# Patient Record
Sex: MALE | Race: BLACK OR AFRICAN AMERICAN | Employment: FULL TIME | URBAN - METROPOLITAN AREA
[De-identification: names, ages, dates, MRNs, and addresses within clinical notes are randomized per-mention and may not be internally consistent; named-entity substitution may affect disease eponyms.]

---

## 2017-11-17 ENCOUNTER — HOSPITAL ENCOUNTER (EMERGENCY)
Age: 37
Discharge: HOME | End: 2017-11-17
Attending: EMERGENCY MEDICINE
Payer: SELF-PAY

## 2017-11-17 VITALS
SYSTOLIC BLOOD PRESSURE: 140 MMHG | HEART RATE: 90 BPM | OXYGEN SATURATION: 98 % | BODY MASS INDEX: 30.31 KG/M2 | RESPIRATION RATE: 18 BRPM | HEIGHT: 68 IN | WEIGHT: 200 LBS | DIASTOLIC BLOOD PRESSURE: 85 MMHG | TEMPERATURE: 98.6 F

## 2017-11-17 DIAGNOSIS — B37.0 THRUSH: Primary | ICD-10-CM

## 2017-11-17 PROCEDURE — 99283 EMERGENCY DEPT VISIT LOW MDM: CPT | Performed by: PHYSICIAN ASSISTANT

## 2017-11-17 RX ORDER — FLUCONAZOLE 150 MG/1
150 TABLET ORAL
Qty: 2 TAB | Refills: 0 | Status: SHIPPED | OUTPATIENT
Start: 2017-11-17 | End: 2017-11-17

## 2017-11-17 RX ORDER — OMEPRAZOLE 10 MG/1
10 CAPSULE, DELAYED RELEASE ORAL DAILY
COMMUNITY
End: 2018-10-01

## 2017-11-17 RX ORDER — NYSTATIN 100000 [USP'U]/ML
1 SUSPENSION ORAL 4 TIMES DAILY
Qty: 473 ML | Refills: 0 | Status: SHIPPED | OUTPATIENT
Start: 2017-11-17 | End: 2018-02-13

## 2017-11-17 NOTE — DISCHARGE INSTRUCTIONS
Candidiasis: Care Instructions  Your Care Instructions  Candidiasis (say \"fyl-dke-VP-uh-rosalba\") is a yeast infection. Yeast normally lives in your body. But it can cause problems if your body's defenses don't work as they should. Some medicines can increase your chance of getting a yeast infection. These include antibiotics, steroids, and cancer drugs. And some diseases like AIDS and diabetes can make you more likely to get yeast infections. There are different types of yeast infections. Satish Payne is a yeast infection in the mouth. It usually occurs in people with weak immune systems. It causes white patches inside the mouth and throat. Yeast infections of the skin usually occur in skin folds where the skin stays moist. They cause red, oozing patches on your skin. Babies can get these infections under the diaper. People who often wear gloves can get them on their hands. Many women get vaginal yeast infections. They are most common when women take antibiotics. These infections can cause the vagina to itch and burn. They also cause white discharge that looks like cottage cheese. In rare cases, yeast infects the blood. This can cause serious disease. This kind of infection is treated with medicine given through a needle into a vein (IV). After you start treatment, a yeast infection usually goes away quickly. But if your immune system is weak, the infection may come back. Tell your doctor if you get yeast infections often. Follow-up care is a key part of your treatment and safety. Be sure to make and go to all appointments, and call your doctor if you are having problems. It's also a good idea to know your test results and keep a list of the medicines you take. How can you care for yourself at home? · Take your medicines exactly as prescribed. Call your doctor if you think you are having a problem with your medicine. · Use antibiotics only as directed by your doctor. · Eat yogurt with live cultures.  It has bacteria called lactobacillus. It may help prevent some types of yeast infections. · Keep your skin clean and dry. Put powder on moist places. · If you are using a cream or suppository to treat a vaginal yeast infection, don't use condoms or a diaphragm. Use a different type of birth control. · Eat a healthy diet and get regular exercise. This will help keep your immune system strong. When should you call for help? Watch closely for changes in your health, and be sure to contact your doctor if:  ? · You do not get better as expected. Where can you learn more? Go to http://roxi-beatriz.info/. Enter X278 in the search box to learn more about \"Candidiasis: Care Instructions. \"  Current as of: October 13, 2016  Content Version: 11.4  © 7404-5784 [employer]. Care instructions adapted under license by [employer] (which disclaims liability or warranty for this information). If you have questions about a medical condition or this instruction, always ask your healthcare professional. [place] [address] any warranty or liability for your use of this information. Thrush in Children: Care Instructions  Your Care Instructions  Diantha Boros is a yeast infection inside the mouth. It can look like milk, formula, or cottage cheese but is hard to remove. If you scrape the thrush away, the skin underneath may bleed. Your child might get thrush after using antibiotics. Often there is not a specific cause. It sometimes occurs at the same time as a diaper rash. Diantha Boros in infants and young children isn't a serious problem. It usually goes away on its own. Some children may need antifungal medicine. Follow-up care is a key part of your child's treatment and safety. Be sure to make and go to all appointments, and call your doctor if your child is having problems.  It's also a good idea to know your child's test results and keep a list of the medicines your child takes. How can you care for your child at home? · Clean bottle nipples and pacifiers regularly in boiling water. · If you are breastfeeding, use an antifungal medicine, such as nystatin (Mycostatin), on your nipples. Dry your nipples after breastfeeding. · If your child is eating solid foods, you can massage plain, unflavored yogurt around the inside of your child's mouth. Check the label to make sure that the yogurt contains live cultures. Yogurt may help healthy bacteria grow in the mouth. These bacteria can stop yeast growth. · Be safe with medicines. Have your child take medicines exactly as prescribed. Call your doctor if you think your child is having a problem with his or her medicine. When should you call for help? Watch closely for changes in your child's health, and be sure to contact your doctor if:  ? · Your child will not eat or drink. ? · You have trouble giving or applying the medicine to your child. ? · Your child still has thrush after 7 days. ? · Your child gets a new diaper rash. ? · Your child is not acting normally. ? · Your child has a fever. Where can you learn more? Go to http://roxi-beatriz.info/. Enter V150 in the search box to learn more about \"Thrush in Children: Care Instructions. \"  Current as of: May 12, 2017  Content Version: 11.4  © 3241-6729 [employer]. Care instructions adapted under license by [employer] (which disclaims liability or warranty for this information). If you have questions about a medical condition or this instruction, always ask your healthcare professional. [place] [address] any warranty or liability for your use of this information.

## 2017-11-17 NOTE — ED TRIAGE NOTES
Pt arrived ambulatory via POV with c/o sores on inside of mouth X1 week. Pt was seen at [facility] within this week for heartburn and started on new meds. Pt states he had sores at that time also.

## 2017-11-17 NOTE — ED PROVIDER NOTES
HPI Comments: Patient is here with a rash to his tongue and all over in his mouth that started a week and a half ago. He has been under a significant amount of stress as his grandmother passed away last week. He is also working quite a bit. He has not had a fever, nausea, vomiting, chest pain, shortness of breath, coughing, abdominal pain, trouble with bowel movements or urine. Patient states he is HIV positive and not on any therapy. He was diagnosed 6 years ago. He does not have a primary care physician and is going to have health insurance in 2-3 weeks through his job. He had some leftover amoxicillin from strep throat approximately 3 months ago so he started taking that to see if that would help however it hasn't. Patient is a 30 y.o. male presenting with oral lesions. The history is provided by the patient. Mouth Lesions    Episode onset: 1 1/2 weeks ago. The problem occurs continuously. The problem has been gradually worsening. The problem is moderate. Nothing relieves the symptoms. Nothing aggravates the symptoms. Associated symptoms include mouth sores. Pertinent negatives include no orthopnea, no fever, no decreased vision, no double vision, no eye itching, no photophobia, no abdominal pain, no constipation, no diarrhea, no nausea, no vomiting, no congestion, no ear discharge, no ear pain, no headaches, no hearing loss, no rhinorrhea, no sore throat, no stridor, no swollen glands, no muscle aches, no neck pain, no neck stiffness, no cough, no URI, no wheezing, no rash, no diaper rash, no eye discharge, no eye pain and no eye redness. He has been eating and drinking normally. There were no sick contacts. No past medical history on file. No past surgical history on file. No family history on file. Social History     Social History    Marital status:      Spouse name: N/A    Number of children: N/A    Years of education: N/A     Occupational History    Not on file. Social History Main Topics    Smoking status: Not on file    Smokeless tobacco: Not on file    Alcohol use Not on file    Drug use: Not on file    Sexual activity: Not on file     Other Topics Concern    Not on file     Social History Narrative         ALLERGIES: Review of patient's allergies indicates no known allergies. Review of Systems   Constitutional: Negative. Negative for fever. HENT: Positive for mouth sores. Negative for congestion, dental problem, drooling, ear discharge, ear pain, facial swelling, hearing loss, nosebleeds, postnasal drip, rhinorrhea, sinus pain, sinus pressure, sneezing, sore throat, tinnitus, trouble swallowing and voice change. Eyes: Negative. Negative for double vision, photophobia, pain, discharge, redness and itching. Respiratory: Negative. Negative for cough, wheezing and stridor. Cardiovascular: Negative. Negative for orthopnea. Gastrointestinal: Negative. Negative for abdominal pain, constipation, diarrhea, nausea and vomiting. Genitourinary: Negative. Musculoskeletal: Negative. Negative for neck pain. Skin: Negative. Negative for rash. Neurological: Negative. Negative for headaches. Psychiatric/Behavioral: Negative. All other systems reviewed and are negative. Vitals:    11/17/17 1019 11/17/17 1106   BP: 143/86 140/85   Pulse: 99 90   Resp: 20 18   Temp: 98.7 °F (37.1 °C) 98.6 °F (37 °C)   SpO2: 97% 98%   Weight: 90.7 kg (200 lb)    Height: 5' 8\" (1.727 m)             Physical Exam   Constitutional: He is oriented to person, place, and time. He appears well-developed and well-nourished. HENT:   Head: Normocephalic and atraumatic. Right Ear: External ear normal.   Left Ear: External ear normal.   Nose: Nose normal.   White exudate on tongue and in pharynx, no swelling of submandibular or ant/post cervical lymphnodes. Eyes: Conjunctivae and EOM are normal. Pupils are equal, round, and reactive to light.    Neck: Normal range of motion. Neck supple. Cardiovascular: Normal rate, regular rhythm, normal heart sounds and intact distal pulses. Pulmonary/Chest: Effort normal and breath sounds normal.   Abdominal: Soft. Bowel sounds are normal.   Musculoskeletal: Normal range of motion. Neurological: He is alert and oriented to person, place, and time. He has normal reflexes. Skin: Skin is warm and dry. Psychiatric: He has a normal mood and affect. His behavior is normal. Judgment and thought content normal.   Nursing note and vitals reviewed. MDM  Number of Diagnoses or Management Options  Thrush: new and requires workup  Risk of Complications, Morbidity, and/or Mortality  Presenting problems: moderate  Diagnostic procedures: moderate  Management options: moderate    Patient Progress  Patient progress: stable    ED Course       Procedures    The patient was observed in the ED. Patient was informed that this appears to be thrush and that he needs to follow up with a primary care physician for HIV management and possible viral load. He was told that he needed to get lots of sleep, trying to decrease the stress in his life, drink plenty of water and return to the ED if worsening. I did write him medication to help with the thrush. I discussed the results of all labs, procedures, radiographs, and treatments with the patient and available family. Treatment plan is agreed upon and the patient is ready for discharge. All voiced understanding of the discharge plan and medication instructions or changes as appropriate. Questions about treatment in the ED were answered. All were encouraged to return should symptoms worsen or new problems develop.

## 2017-11-17 NOTE — ED NOTES
I have reviewed discharge instructions with the patient. The patient verbalized understanding. Patient left ED via Discharge Method: ambulatory to Home  Opportunity for questions and clarification provided. Patient given 2 scripts. To continue your aftercare when you leave the hospital, you may receive an automated call from our care team to check in on how you are doing. This is a free service and part of our promise to provide the best care and service to meet your aftercare needs.  If you have questions, or wish to unsubscribe from this service please call [phone]. Thank you for Choosing our [facility] Emergency Department.

## 2018-02-13 ENCOUNTER — HOSPITAL ENCOUNTER (EMERGENCY)
Age: 38
Discharge: HOME | End: 2018-02-13
Attending: EMERGENCY MEDICINE
Payer: SELF-PAY

## 2018-02-13 VITALS
WEIGHT: 200 LBS | BODY MASS INDEX: 30.31 KG/M2 | HEART RATE: 105 BPM | OXYGEN SATURATION: 98 % | HEIGHT: 68 IN | RESPIRATION RATE: 16 BRPM | SYSTOLIC BLOOD PRESSURE: 151 MMHG | TEMPERATURE: 99.9 F | DIASTOLIC BLOOD PRESSURE: 87 MMHG

## 2018-02-13 DIAGNOSIS — B37.0 THRUSH, ORAL: Primary | ICD-10-CM

## 2018-02-13 LAB
ATRIAL RATE: 94 BPM
CALCULATED P AXIS, ECG09: 65 DEGREES
CALCULATED R AXIS, ECG10: 49 DEGREES
CALCULATED T AXIS, ECG11: 32 DEGREES
DIAGNOSIS, 93000: NORMAL
P-R INTERVAL, ECG05: 156 MS
Q-T INTERVAL, ECG07: 366 MS
QRS DURATION, ECG06: 80 MS
QTC CALCULATION (BEZET), ECG08: 457 MS
VENTRICULAR RATE, ECG03: 94 BPM

## 2018-02-13 PROCEDURE — 93005 ELECTROCARDIOGRAM TRACING: CPT | Performed by: NURSE PRACTITIONER

## 2018-02-13 PROCEDURE — 99283 EMERGENCY DEPT VISIT LOW MDM: CPT | Performed by: NURSE PRACTITIONER

## 2018-02-13 RX ORDER — FLUCONAZOLE 150 MG/1
150 TABLET ORAL
Qty: 2 TAB | Refills: 0 | Status: SHIPPED | OUTPATIENT
Start: 2018-02-13 | End: 2018-02-13

## 2018-02-13 RX ORDER — NYSTATIN 100000 [USP'U]/ML
1 SUSPENSION ORAL 4 TIMES DAILY
Qty: 473 ML | Refills: 0 | Status: SHIPPED | OUTPATIENT
Start: 2018-02-13 | End: 2018-10-01

## 2018-02-13 RX ORDER — MUPIROCIN 20 MG/G
OINTMENT TOPICAL 3 TIMES DAILY
Qty: 22 G | Refills: 0 | Status: SHIPPED | OUTPATIENT
Start: 2018-02-13 | End: 2018-10-01

## 2018-02-13 NOTE — DISCHARGE INSTRUCTIONS
Candidiasis: Care Instructions  Your Care Instructions  Candidiasis (say \"vpg-ygb-ZX-uh-rosalba\") is a yeast infection. Yeast normally lives in your body. But it can cause problems if your body's defenses don't work as they should. Some medicines can increase your chance of getting a yeast infection. These include antibiotics, steroids, and cancer drugs. And some diseases like AIDS and diabetes can make you more likely to get yeast infections. There are different types of yeast infections. Tresa Begun is a yeast infection in the mouth. It usually occurs in people with weak immune systems. It causes white patches inside the mouth and throat. Yeast infections of the skin usually occur in skin folds where the skin stays moist. They cause red, oozing patches on your skin. Babies can get these infections under the diaper. People who often wear gloves can get them on their hands. Many women get vaginal yeast infections. They are most common when women take antibiotics. These infections can cause the vagina to itch and burn. They also cause white discharge that looks like cottage cheese. In rare cases, yeast infects the blood. This can cause serious disease. This kind of infection is treated with medicine given through a needle into a vein (IV). After you start treatment, a yeast infection usually goes away quickly. But if your immune system is weak, the infection may come back. Tell your doctor if you get yeast infections often. Follow-up care is a key part of your treatment and safety. Be sure to make and go to all appointments, and call your doctor if you are having problems. It's also a good idea to know your test results and keep a list of the medicines you take. How can you care for yourself at home? · Take your medicines exactly as prescribed. Call your doctor if you think you are having a problem with your medicine. · Use antibiotics only as directed by your doctor. · Eat yogurt with live cultures.  It has bacteria called lactobacillus. It may help prevent some types of yeast infections. · Keep your skin clean and dry. Put powder on moist places. · If you are using a cream or suppository to treat a vaginal yeast infection, don't use condoms or a diaphragm. Use a different type of birth control. · Eat a healthy diet and get regular exercise. This will help keep your immune system strong. When should you call for help? Watch closely for changes in your health, and be sure to contact your doctor if:  ? · You do not get better as expected. Where can you learn more? Go to http://roxi-beatriz.info/. Enter A393 in the search box to learn more about \"Candidiasis: Care Instructions. \"  Current as of: October 13, 2016  Content Version: 11.4  © 7443-6805 [employer]. Care instructions adapted under license by [employer] (which disclaims liability or warranty for this information). If you have questions about a medical condition or this instruction, always ask your healthcare professional. [address] any warranty or liability for your use of this information.

## 2018-02-13 NOTE — ED TRIAGE NOTES
Patient complaining of sore throat with white patches present x 1 week. Worse to swallow. Patient also advises that he has had some chest pain however denies at this time. Patient confirms cough.

## 2018-02-13 NOTE — ED PROVIDER NOTES
HPI Comments: Patient presents with mouth and sore throat for the past week. He states he has white patches in his mouth and on his tonsils. He denies fever. He states pain increases with swallowing. He states cough. He states chest pain in the past but denies currently. He is also concerned about \"sores\" in his nose. Patient states history of HIV. Patient is a 30 y.o. male presenting with sore throat. The history is provided by the patient. Sore Throat    This is a new problem. The current episode started more than 1 week ago. The problem has been gradually worsening. There has been no fever. Associated symptoms include trouble swallowing and cough. Pertinent negatives include no diarrhea and no vomiting. He has had no exposure to strep or mono. He has tried nothing for the symptoms. Past Medical History:   Diagnosis Date    [facility] immunodeficiency virus [facility]) ([facility]        History reviewed. No pertinent surgical history. History reviewed. No pertinent family history. Social History     Social History    Marital status:      Spouse name: N/A    Number of children: N/A    Years of education: N/A     Occupational History    Not on file. Social History Main Topics    Smoking status: Current Every Day Smoker     Packs/day: 0.50    Smokeless tobacco: Never Used    Alcohol use Yes      Comment: 3-4 a week    Drug use: Yes     Special: Marijuana    Sexual activity: Not on file     Other Topics Concern    Not on file     Social History Narrative    No narrative on file         ALLERGIES: Review of patient's allergies indicates no known allergies. Review of Systems   Constitutional: Negative for chills and fever. HENT: Positive for mouth sores, sore throat and trouble swallowing. Respiratory: Positive for cough. Cardiovascular: Positive for chest pain (not currently). Gastrointestinal: Negative for abdominal pain, constipation, diarrhea, nausea and vomiting. Neurological: Negative for dizziness and weakness. Vitals:    02/13/18 1232   BP: 151/87   Pulse: (!) 105   Resp: 16   Temp: 99.9 °F (37.7 °C)   SpO2: 98%   Weight: 90.7 kg (200 lb)   Height: 5' 8\" (1.727 m)            Physical Exam   Constitutional: He is oriented to person, place, and time. No distress. HENT:   Nose: Sinus tenderness present. Mouth/Throat: Uvula is midline. Oral lesions present. Posterior oropharyngeal erythema present. Cardiovascular: Normal rate. Pulmonary/Chest: Effort normal. No respiratory distress. Musculoskeletal: Normal range of motion. Neurological: He is alert and oriented to person, place, and time. Skin: Skin is warm and dry. He is not diaphoretic. Psychiatric: He has a normal mood and affect. His behavior is normal.   Nursing note and vitals reviewed. MDM  Number of Diagnoses or Management Options  Thrush, oral:   Diagnosis management comments: Patent given diflucan and nystatin for mouth lesions. Patient given Bactroban for his nose. No acute changes noted on ECG.         Amount and/or Complexity of Data Reviewed  Clinical lab tests: ordered and reviewed    Patient Progress  Patient progress: stable        ED Course       Procedures

## 2018-10-01 ENCOUNTER — HOSPITAL ENCOUNTER (EMERGENCY)
Age: 38
Discharge: HOME | End: 2018-10-01
Attending: EMERGENCY MEDICINE
Payer: SELF-PAY

## 2018-10-01 ENCOUNTER — APPOINTMENT (OUTPATIENT)
Dept: GENERAL RADIOLOGY | Age: 38
End: 2018-10-01
Attending: EMERGENCY MEDICINE
Payer: SELF-PAY

## 2018-10-01 VITALS
WEIGHT: 223 LBS | SYSTOLIC BLOOD PRESSURE: 135 MMHG | TEMPERATURE: 98.1 F | RESPIRATION RATE: 18 BRPM | DIASTOLIC BLOOD PRESSURE: 78 MMHG | OXYGEN SATURATION: 98 % | BODY MASS INDEX: 35 KG/M2 | HEIGHT: 67 IN | HEART RATE: 95 BPM

## 2018-10-01 DIAGNOSIS — S39.012A STRAIN OF LUMBAR REGION, INITIAL ENCOUNTER: Primary | ICD-10-CM

## 2018-10-01 LAB
ANION GAP SERPL CALC-SCNC: 7 MMOL/L
BASOPHILS # BLD: 0.1 K/UL (ref 0–0.2)
BASOPHILS NFR BLD: 1 % (ref 0–2)
BUN SERPL-MCNC: 14 MG/DL (ref 6–23)
CALCIUM SERPL-MCNC: 8.3 MG/DL (ref 8.3–10.4)
CHLORIDE SERPL-SCNC: 107 MMOL/L (ref 98–107)
CK SERPL-CCNC: 87 U/L (ref 21–215)
CO2 SERPL-SCNC: 26 MMOL/L (ref 21–32)
CREAT SERPL-MCNC: 1.01 MG/DL (ref 0.8–1.5)
DIFFERENTIAL METHOD BLD: ABNORMAL
EOSINOPHIL # BLD: 0.1 K/UL (ref 0–0.8)
EOSINOPHIL NFR BLD: 1 % (ref 0.5–7.8)
ERYTHROCYTE [DISTWIDTH] IN BLOOD BY AUTOMATED COUNT: 12.4 %
GLUCOSE SERPL-MCNC: 93 MG/DL (ref 65–100)
HCT VFR BLD AUTO: 41.8 % (ref 41.1–50.3)
HGB BLD-MCNC: 13.1 G/DL (ref 13.6–17.2)
IMM GRANULOCYTES # BLD: 0.2 K/UL (ref 0–0.5)
IMM GRANULOCYTES NFR BLD AUTO: 2 % (ref 0–5)
LYMPHOCYTES # BLD: 2.8 K/UL (ref 0.5–4.6)
LYMPHOCYTES NFR BLD: 27 % (ref 13–44)
MCH RBC QN AUTO: 29.6 PG (ref 26.1–32.9)
MCHC RBC AUTO-ENTMCNC: 31.3 G/DL (ref 31.4–35)
MCV RBC AUTO: 94.6 FL (ref 79.6–97.8)
MONOCYTES # BLD: 0.9 K/UL (ref 0.1–1.3)
MONOCYTES NFR BLD: 9 % (ref 4–12)
NEUTS SEG # BLD: 6 K/UL (ref 1.7–8.2)
NEUTS SEG NFR BLD: 59 % (ref 43–78)
NRBC # BLD: 0 K/UL (ref 0–0.2)
PLATELET # BLD AUTO: 222 K/UL (ref 150–450)
PMV BLD AUTO: 10 FL (ref 9.4–12.3)
POTASSIUM SERPL-SCNC: 3.5 MMOL/L (ref 3.5–5.1)
RBC # BLD AUTO: 4.42 M/UL (ref 4.23–5.6)
SODIUM SERPL-SCNC: 140 MMOL/L (ref 136–145)
WBC # BLD AUTO: 10.2 K/UL (ref 4.3–11.1)

## 2018-10-01 PROCEDURE — 80048 BASIC METABOLIC PNL TOTAL CA: CPT

## 2018-10-01 PROCEDURE — 51798 US URINE CAPACITY MEASURE: CPT

## 2018-10-01 PROCEDURE — 81003 URINALYSIS AUTO W/O SCOPE: CPT | Performed by: EMERGENCY MEDICINE

## 2018-10-01 PROCEDURE — 82550 ASSAY OF CK (CPK): CPT

## 2018-10-01 PROCEDURE — 99284 EMERGENCY DEPT VISIT MOD MDM: CPT | Performed by: EMERGENCY MEDICINE

## 2018-10-01 PROCEDURE — 85025 COMPLETE CBC W/AUTO DIFF WBC: CPT

## 2018-10-01 PROCEDURE — 72100 X-RAY EXAM L-S SPINE 2/3 VWS: CPT

## 2018-10-01 PROCEDURE — 51798 US URINE CAPACITY MEASURE: CPT | Performed by: EMERGENCY MEDICINE

## 2018-10-01 RX ORDER — TRAMADOL HYDROCHLORIDE 50 MG/1
50-100 TABLET ORAL
Qty: 12 TAB | Refills: 0 | Status: SHIPPED | OUTPATIENT
Start: 2018-10-01 | End: 2019-02-21

## 2018-10-01 RX ORDER — NAPROXEN SODIUM 550 MG/1
550 TABLET ORAL 2 TIMES DAILY WITH MEALS
Qty: 10 TAB | Refills: 0 | Status: SHIPPED | OUTPATIENT
Start: 2018-10-01 | End: 2019-02-21

## 2018-10-01 RX ORDER — METHOCARBAMOL 750 MG/1
750 TABLET, FILM COATED ORAL 4 TIMES DAILY
Qty: 28 TAB | Refills: 0 | Status: SHIPPED | OUTPATIENT
Start: 2018-10-01 | End: 2019-02-21

## 2018-10-01 NOTE — LETTER
[facility] EMERGENCY DEPT 
[address] [place] [id number] 
[phone] Work/School Note Date: 10/1/2018 To Whom It May concern: 
 
Tere Tinoco was seen and treated today in the emergency room by the following provider(s): 
Attending Provider: Walter Brar MD.   
 
Tere Tinoco may return to work on 10/3. Sincerely, Walter Brar MD

## 2018-10-01 NOTE — PROGRESS NOTES
Visited with patient as no PCP listed in chart. Patient states he has been going to [facility]. States he was just diagnosed with HIV in June and is established with [facility]. States they told him that Medicaid open enrollment was in October and they would be applying for Medicaid for him. [facility] sends all bills to [facility] and they take care of them.

## 2018-10-01 NOTE — ED PROVIDER NOTES
HPI Comments: 82-year-old male onset of some low back pain radiates to both proximal thighs. Describes as a sharp intermittent pain and it'll sometimes causes him to almost have his legs give out. No numbness or weakness. No pain radiation down into the legs or feet. No change in urine. No fever or chills. No rash. No trauma. Patient is a 30 y.o. male presenting with back pain. The history is provided by the patient. Back Pain This is a new problem. The current episode started yesterday. The problem has not changed since onset. The pain is associated with no known injury. The pain is present in the lower back. The quality of the pain is described as stabbing and shooting. The pain radiates to the right thigh and left thigh. The pain is moderate. The symptoms are aggravated by bending. Pertinent negatives include no fever, no abdominal pain, no bowel incontinence, no bladder incontinence, no dysuria, no paresthesias, no paresis and no weakness. Past Medical History:  
Diagnosis Date  HIV (human immunodeficiency virus infection) ([facility].) History reviewed. No pertinent surgical history. History reviewed. No pertinent family history. Social History Social History  Marital status: SINGLE Spouse name: N/A  
 Number of children: N/A  
 Years of education: N/A Occupational History  Not on file. Social History Main Topics  Smoking status: Current Every Day Smoker Packs/day: 0.50  Smokeless tobacco: Never Used  Alcohol use Yes Comment: 3-4 a week  Drug use: Yes Special: Marijuana  Sexual activity: Not on file Other Topics Concern  Not on file Social History Narrative ALLERGIES: Review of patient's allergies indicates no known allergies. Review of Systems Constitutional: Negative for chills and fever. Gastrointestinal: Negative for abdominal pain and bowel incontinence. Genitourinary: Negative for bladder incontinence, difficulty urinating, dysuria and flank pain. Musculoskeletal: Positive for back pain. Neurological: Negative for seizures, weakness and paresthesias. Vitals:  
 10/01/18 8647 BP: 136/59 Pulse: 99 Resp: 14 Temp: 98.2 °F (36.8 °C) SpO2: 97% Weight: 101.2 kg (223 lb) Height: 5' 7\" (1.702 m) Physical Exam  
Constitutional: He appears well-developed and well-nourished. No distress. Pulmonary/Chest: Effort normal. No respiratory distress. Abdominal: Soft. There is no tenderness. Hernia confirmed negative in the right inguinal area and confirmed negative in the left inguinal area. Musculoskeletal:  
     Lumbar back: He exhibits decreased range of motion and tenderness. He exhibits no bony tenderness. Neurological: He has normal strength. Reflex Scores: 
     Brachioradialis reflexes are 1+ on the right side and 1+ on the left side. Patellar reflexes are 1+ on the right side and 1+ on the left side. Patient has back pain only with straight leg raising. This is bilateral.  
Nursing note and vitals reviewed. MDM Number of Diagnoses or Management Options Diagnosis management comments: Suspect thigh pain is due to back issues. Possibility mechanical back pain but possibility of radicular pain as well. Plain films. No evidence for urinary retention or need for MRI at this point. Amount and/or Complexity of Data Reviewed Clinical lab tests: ordered and reviewed Tests in the radiology section of CPT®: ordered and reviewed Independent visualization of images, tracings, or specimens: yes Risk of Complications, Morbidity, and/or Mortality Presenting problems: moderate Diagnostic procedures: minimal 
Management options: low Patient Progress Patient progress: stable ED Course Procedures Results Include: 
 
Recent Results (from the past 24 hour(s)) METABOLIC PANEL, BASIC  
 Collection Time: 10/01/18 10:23 AM  
Result Value Ref Range Sodium 140 136 - 145 mmol/L Potassium 3.5 3.5 - 5.1 mmol/L Chloride 107 98 - 107 mmol/L  
 CO2 26 21 - 32 mmol/L Anion gap 7 mmol/L Glucose 93 65 - 100 mg/dL BUN 14 6 - 23 MG/DL Creatinine 1.01 0.8 - 1.5 MG/DL  
 GFR est AA >60 >60 ml/min/1.73m2 GFR est non-AA >60 ml/min/1.73m2 Calcium 8.3 8.3 - 10.4 MG/DL  
CBC WITH AUTOMATED DIFF Collection Time: 10/01/18 10:23 AM  
Result Value Ref Range WBC 10.2 4.3 - 11.1 K/uL  
 RBC 4.42 4.23 - 5.6 M/uL  
 HGB 13.1 (L) 13.6 - 17.2 g/dL HCT 41.8 41.1 - 50.3 % MCV 94.6 79.6 - 97.8 FL  
 MCH 29.6 26.1 - 32.9 PG  
 MCHC 31.3 (L) 31.4 - 35.0 g/dL  
 RDW 12.4 % PLATELET 097 091 - 129 K/uL MPV 10.0 9.4 - 12.3 FL ABSOLUTE NRBC 0.00 0.0 - 0.2 K/uL  
 DF AUTOMATED NEUTROPHILS 59 43 - 78 % LYMPHOCYTES 27 13 - 44 % MONOCYTES 9 4.0 - 12.0 % EOSINOPHILS 1 0.5 - 7.8 % BASOPHILS 1 0.0 - 2.0 % IMMATURE GRANULOCYTES 2 0.0 - 5.0 %  
 ABS. NEUTROPHILS 6.0 1.7 - 8.2 K/UL  
 ABS. LYMPHOCYTES 2.8 0.5 - 4.6 K/UL  
 ABS. MONOCYTES 0.9 0.1 - 1.3 K/UL  
 ABS. EOSINOPHILS 0.1 0.0 - 0.8 K/UL  
 ABS. BASOPHILS 0.1 0.0 - 0.2 K/UL  
 ABS. IMM. GRANS. 0.2 0.0 - 0.5 K/UL  
CK Collection Time: 10/01/18 10:23 AM  
Result Value Ref Range CK 87 21 - 215 U/L Xr Spine Lumb 2 Or 3 V Result Date: 10/1/2018 LUMBAR SPINE, 3 views. HISTORY: Low back pain. No known injury. TECHNIQUE: AP, lateral and cone-down lumbosacral junction views. IMPRESSION: Spinal alignment anatomic. Pedicles appear intact. SI joints unremarkable. Disk spaces maintained. Minimal wedging at T12.  
 
 
 11:28 AM 
On recheck, patient not really tender at all to percussion or palpation around T12. Most of the symptoms are at the lumbosacral junction. The patient the wedging may be congenital or long-term. Suggested he recheck his primary care doctor to consider re-x-ray of the month or 2. No signs of any red flags regarding neck pain with regard to any sort of spinal canal infection or cauda equina. Patient had no residual urine postvoid

## 2018-10-01 NOTE — ED TRIAGE NOTES
Pt to ED c/o lower back pain and pains in bilateral legs. Started last night. Taking ibuprofen w/o relief. States \"the pain gets so bad that my legs give out. \" Pt states he works at [employer]. Able to ambulate w/o difficulty.

## 2018-10-01 NOTE — ED NOTES
I have reviewed discharge instructions with the patient. The patient verbalized understanding. Patient left ED via Discharge Method: ambulatory to Home with (self). Opportunity for questions and clarification provided. Patient given 3 scripts. To continue your aftercare when you leave the hospital, you may receive an automated call from our care team to check in on how you are doing. This is a free service and part of our promise to provide the best care and service to meet your aftercare needs.  If you have questions, or wish to unsubscribe from this service please call [phone]. Thank you for Choosing our [facility] Emergency Department.

## 2018-10-01 NOTE — DISCHARGE INSTRUCTIONS
Rest.  Heating pad or hot water bottle. Limit bending and lifting. Call your doctor for appointment for recheck if symptoms have not improved in the next 3-4 days. Recheck sooner for fever, rash, worrisome symptoms. Back Strain: Care Instructions  Your Care Instructions    Back strain happens when you overstretch, or pull, a muscle in your back. You may hurt your back in an accident or when you exercise or lift something. Most back pain will get better with rest and time. You can take care of yourself at home to help your back heal.  Follow-up care is a key part of your treatment and safety. Be sure to make and go to all appointments, and call your doctor if you are having problems. It's also a good idea to know your test results and keep a list of the medicines you take. How can you care for yourself at home? · Try to stay as active as you can, but stop or reduce any activity that causes pain. · Put ice or a cold pack on the sore muscle for 10 to 20 minutes at a time to stop swelling. Try this every 1 to 2 hours for 3 days (when you are awake) or until the swelling goes down. Put a thin cloth between the ice pack and your skin. · After 2 or 3 days, apply a heating pad on low or a warm cloth to your back. Some doctors suggest that you go back and forth between hot and cold treatments. · Take pain medicines exactly as directed. ¨ If the doctor gave you a prescription medicine for pain, take it as prescribed. ¨ If you are not taking a prescription pain medicine, ask your doctor if you can take an over-the-counter medicine. · Try sleeping on your side with a pillow between your legs. Or put a pillow under your knees when you lie on your back. These measures can ease pain in your lower back. · Return to your usual level of activity slowly. When should you call for help? Call 911 anytime you think you may need emergency care.  For example, call if:    · You are unable to move a leg at all.   [facility] your doctor now or seek immediate medical care if:    · You have new or worse symptoms in your legs, belly, or buttocks. Symptoms may include:  ¨ Numbness or tingling. ¨ Weakness. ¨ Pain.     · You lose bladder or bowel control.    Watch closely for changes in your health, and be sure to contact your doctor if:    · You have a fever, lose weight, or don't feel well.     · You are not getting better as expected. Where can you learn more? Go to http://roxi-beatriz.info/. Enter S639 in the search box to learn more about \"Back Strain: Care Instructions. \"  Current as of: November 29, 2017  Content Version: 11.7  © 6868-5191 [employer]. Care instructions adapted under license by [employer] (which disclaims liability or warranty for this information). If you have questions about a medical condition or this instruction, always ask your healthcare professional. [address] any warranty or liability for your use of this information.

## 2019-02-21 ENCOUNTER — APPOINTMENT (OUTPATIENT)
Dept: GENERAL RADIOLOGY | Age: 39
End: 2019-02-21
Attending: STUDENT IN AN ORGANIZED HEALTH CARE EDUCATION/TRAINING PROGRAM
Payer: COMMERCIAL

## 2019-02-21 ENCOUNTER — HOSPITAL ENCOUNTER (EMERGENCY)
Age: 39
Discharge: HOME | End: 2019-02-21
Attending: STUDENT IN AN ORGANIZED HEALTH CARE EDUCATION/TRAINING PROGRAM
Payer: COMMERCIAL

## 2019-02-21 VITALS
HEIGHT: 67 IN | WEIGHT: 220 LBS | RESPIRATION RATE: 20 BRPM | TEMPERATURE: 99.6 F | HEART RATE: 104 BPM | BODY MASS INDEX: 34.53 KG/M2 | OXYGEN SATURATION: 99 % | SYSTOLIC BLOOD PRESSURE: 148 MMHG | DIASTOLIC BLOOD PRESSURE: 78 MMHG

## 2019-02-21 DIAGNOSIS — J10.1 INFLUENZA A: Primary | ICD-10-CM

## 2019-02-21 LAB
FLUAV AG NPH QL IA: POSITIVE
FLUBV AG NPH QL IA: NEGATIVE
SPECIMEN SOURCE: ABNORMAL

## 2019-02-21 PROCEDURE — 74011250637 HC RX REV CODE- 250/637: Performed by: STUDENT IN AN ORGANIZED HEALTH CARE EDUCATION/TRAINING PROGRAM

## 2019-02-21 PROCEDURE — 87804 INFLUENZA ASSAY W/OPTIC: CPT

## 2019-02-21 PROCEDURE — 99283 EMERGENCY DEPT VISIT LOW MDM: CPT | Performed by: STUDENT IN AN ORGANIZED HEALTH CARE EDUCATION/TRAINING PROGRAM

## 2019-02-21 PROCEDURE — 71046 X-RAY EXAM CHEST 2 VIEWS: CPT

## 2019-02-21 RX ORDER — IBUPROFEN 800 MG/1
800 TABLET ORAL
Status: COMPLETED | OUTPATIENT
Start: 2019-02-21 | End: 2019-02-21

## 2019-02-21 RX ORDER — IBUPROFEN 800 MG/1
800 TABLET ORAL
Qty: 20 TAB | Refills: 0 | Status: SHIPPED | OUTPATIENT
Start: 2019-02-21 | End: 2019-02-28

## 2019-02-21 RX ORDER — OSELTAMIVIR PHOSPHATE 75 MG/1
75 CAPSULE ORAL 2 TIMES DAILY
Qty: 10 CAP | Refills: 0 | Status: SHIPPED | OUTPATIENT
Start: 2019-02-21 | End: 2019-02-26

## 2019-02-21 RX ADMIN — IBUPROFEN 800 MG: 800 TABLET, FILM COATED ORAL at 08:40

## 2019-02-21 NOTE — DISCHARGE INSTRUCTIONS
Patient Education        Influenza (Flu): Care Instructions  Your Care Instructions    Influenza (flu) is an infection in the lungs and breathing passages. It is caused by the influenza virus. There are different strains, or types, of the flu virus from year to year. Unlike the common cold, the flu comes on suddenly and the symptoms, such as a cough, congestion, fever, chills, fatigue, aches, and pains, are more severe. These symptoms may last up to 10 days. Although the flu can make you feel very sick, it usually doesn't cause serious health problems. Home treatment is usually all you need for flu symptoms. But your doctor may prescribe antiviral medicine to prevent other health problems, such as pneumonia, from developing. Older people and those who have a long-term health condition, such as lung disease, are most at risk for having pneumonia or other health problems. Follow-up care is a key part of your treatment and safety. Be sure to make and go to all appointments, and call your doctor if you are having problems. It's also a good idea to know your test results and keep a list of the medicines you take. How can you care for yourself at home? · Get plenty of rest.  · Drink plenty of fluids, enough so that your urine is light yellow or clear like water. If you have kidney, heart, or liver disease and have to limit fluids, talk with your doctor before you increase the amount of fluids you drink. · Take an over-the-counter pain medicine if needed, such as acetaminophen (Tylenol), ibuprofen (Advil, Motrin), or naproxen (Aleve), to relieve fever, headache, and muscle aches. Read and follow all instructions on the label. No one younger than 20 should take aspirin. It has been linked to Reye syndrome, a serious illness. · Do not smoke. Smoking can make the flu worse. If you need help quitting, talk to your doctor about stop-smoking programs and medicines.  These can increase your chances of quitting for good.  · Breathe moist air from a hot shower or from a sink filled with hot water to help clear a stuffy nose. · Before you use cough and cold medicines, check the label. These medicines may not be safe for young children or for people with certain health problems. · If the skin around your nose and lips becomes sore, put some petroleum jelly on the area. · To ease coughing:  ? Drink fluids to soothe a scratchy throat. ? Suck on cough drops or plain hard candy. ? Take an over-the-counter cough medicine that contains dextromethorphan to help you get some sleep. Read and follow all instructions on the label. ? Raise your head at night with an extra pillow. This may help you rest if coughing keeps you awake. · Take any prescribed medicine exactly as directed. Call your doctor if you think you are having a problem with your medicine. To avoid spreading the flu  · Wash your hands regularly, and keep your hands away from your face. · Stay home from school, work, and other public places until you are feeling better and your fever has been gone for at least 24 hours. The fever needs to have gone away on its own without the help of medicine. · Ask people living with you to talk to their doctors about preventing the flu. They may get antiviral medicine to keep from getting the flu from you. · To prevent the flu in the future, get a flu vaccine every fall. Encourage people living with you to get the vaccine. · Cover your mouth when you cough or sneeze. When should you call for help? Call 911 anytime you think you may need emergency care.  For example, call if:    · You have severe trouble breathing.    Call your doctor now or seek immediate medical care if:    · You have new or worse trouble breathing.     · You seem to be getting much sicker.     · You feel very sleepy or confused.     · You have a new or higher fever.     · You get a new rash.    Watch closely for changes in your health, and be sure to contact your doctor if:    · You begin to get better and then get worse.     · You are not getting better after 1 week. Where can you learn more? Go to http://roxi-beatriz.info/. Enter Y605 in the search box to learn more about \"Influenza (Flu): Care Instructions. \"  Current as of: September 5, 2018  Content Version: 11.9  © 2289-5804 [employer]. Care instructions adapted under license by [employer] (which disclaims liability or warranty for this information). If you have questions about a medical condition or this instruction, always ask your healthcare professional. [place] [address] any warranty or liability for your use of this information.

## 2019-02-21 NOTE — ED PROVIDER NOTES
70-year-old male patient presents with reports of diffuse body aches, subjective fevers/chills, nonproductive cough and headache. Patient states he symptoms have been present intermittently for approximately 2-3 days. He reports close contacts at work with similar symptoms. He did not receive a flu shot this year. Patient denies significant shortness of breath, chest pain, neck pain or stiffness. Denies nausea or vomiting. History of HIV positive status currently under treatment through primary care, most recent CD4 count was within normal limits. Undetectable viral load per report. The history is provided by the patient. No  was used. Cough This is a new problem. The current episode started 2 days ago. The problem occurs constantly. The problem has not changed since onset. The cough is non-productive. Patient reports a subjective fever - was not measured. Associated symptoms include chills, headaches, rhinorrhea, sore throat and myalgias. Pertinent negatives include no chest pain, no shortness of breath, no wheezing, no nausea and no vomiting. He has tried nothing for the symptoms. He is not a smoker. Past Medical History:  
Diagnosis Date  HIV (human immunodeficiency virus infection) ([facility] History reviewed. No pertinent surgical history. History reviewed. No pertinent family history. Social History Socioeconomic History  Marital status: SINGLE Spouse name: Not on file  Number of children: Not on file  Years of education: Not on file  Highest education level: Not on file Social Needs  Financial resource strain: Not on file  Food insecurity - worry: Not on file  Food insecurity - inability: Not on file  Transportation needs - medical: Not on file  Transportation needs - non-medical: Not on file Occupational History  Not on file Tobacco Use  Smoking status: Current Every Day Smoker Packs/day: 0.50  Smokeless tobacco: Never Used Substance and Sexual Activity  Alcohol use: Yes Comment: 3-4 a week  Drug use: Yes Types: Marijuana  Sexual activity: Not on file Other Topics Concern  Not on file Social History Narrative  Not on file ALLERGIES: Patient has no known allergies. Review of Systems Constitutional: Positive for chills. Negative for diaphoresis and fever. HENT: Positive for rhinorrhea and sore throat. Negative for congestion and sneezing. Eyes: Negative for visual disturbance. Respiratory: Positive for cough. Negative for chest tightness, shortness of breath and wheezing. Cardiovascular: Negative for chest pain and leg swelling. Gastrointestinal: Negative for abdominal pain, blood in stool, diarrhea, nausea and vomiting. Endocrine: Negative for polyuria. Genitourinary: Negative for difficulty urinating, dysuria, flank pain, hematuria and urgency. Musculoskeletal: Positive for myalgias. Negative for back pain, neck pain and neck stiffness. Skin: Negative for color change and rash. Neurological: Positive for headaches. Negative for dizziness, syncope, speech difficulty, weakness, light-headedness and numbness. Psychiatric/Behavioral: Negative for behavioral problems. All other systems reviewed and are negative. Vitals:  
 02/21/19 9961 BP: 148/78 Pulse: (!) 104 Resp: 20 Temp: 99.6 °F (37.6 °C) SpO2: 99% Weight: 99.8 kg (220 lb) Height: 5' 7\" (1.702 m) Physical Exam  
Constitutional: He is oriented to person, place, and time. He appears well-developed and well-nourished. No distress. Overall well-appearing, Alert and oriented to person place and time. No acute distress, speaks in clear, fluid sentences. HENT:  
Head: Normocephalic and atraumatic. Right Ear: External ear normal.  
Left Ear: External ear normal.  
Nose: Nose normal.  
Eyes: EOM are normal. Pupils are equal, round, and reactive to light. Neck: Normal range of motion. Cardiovascular: Normal rate, regular rhythm, normal heart sounds and intact distal pulses. Exam reveals no gallop and no friction rub. No murmur heard. Pulmonary/Chest: Effort normal and breath sounds normal. No respiratory distress. He has no wheezes. He has no rales. He exhibits no tenderness. No focal findings Abdominal: Soft. He exhibits no distension and no mass. There is no tenderness. There is no rebound and no guarding. No hernia. Musculoskeletal: Normal range of motion. He exhibits no edema, tenderness or deformity. Neurological: He is alert and oriented to person, place, and time. No cranial nerve deficit. Skin: Skin is warm and dry. He is not diaphoretic. Nursing note and vitals reviewed. MDM Number of Diagnoses or Management Options Influenza A:  
Diagnosis management comments: Chest x-ray clear, influenza positive. Patient will be treated symptomatically and refer to primary care. ,Voice dictation software was used during the making of this note. This software is not perfect and grammatical and other typographical errors may be present. This note has been proofread, but may still contain errors. [address] [facility]; 2/21/2019 @1:52 PM  
=================================================================== Amount and/or Complexity of Data Reviewed Clinical lab tests: ordered and reviewed Independent visualization of images, tracings, or specimens: yes Risk of Complications, Morbidity, and/or Mortality Presenting problems: moderate Diagnostic procedures: low Management options: moderate Patient Progress Patient progress: stable Procedures

## 2019-02-21 NOTE — LETTER
[facility] EMERGENCY DEPT 
[address] [place] [id number] 
[phone] Work/School Note Date: 2/21/2019 To Whom It May concern: 
 
Patricia Hylton was seen and treated today in the emergency room by the following provider(s): 
Attending Provider: Noris Jackson DO. Patricia Hylton may return to work on 02/25/19. Sincerely, Charmayne Bossier, RN

## 2019-06-14 ENCOUNTER — APPOINTMENT (OUTPATIENT)
Dept: GENERAL RADIOLOGY | Age: 39
End: 2019-06-14
Attending: EMERGENCY MEDICINE
Payer: COMMERCIAL

## 2019-06-14 ENCOUNTER — HOSPITAL ENCOUNTER (EMERGENCY)
Age: 39
Discharge: HOME | End: 2019-06-14
Attending: EMERGENCY MEDICINE
Payer: COMMERCIAL

## 2019-06-14 VITALS
RESPIRATION RATE: 16 BRPM | DIASTOLIC BLOOD PRESSURE: 103 MMHG | HEIGHT: 66 IN | HEART RATE: 84 BPM | OXYGEN SATURATION: 99 % | SYSTOLIC BLOOD PRESSURE: 173 MMHG | TEMPERATURE: 98.5 F | BODY MASS INDEX: 38.57 KG/M2 | WEIGHT: 240 LBS

## 2019-06-14 DIAGNOSIS — M72.2 PLANTAR FASCIITIS OF RIGHT FOOT: Primary | ICD-10-CM

## 2019-06-14 PROCEDURE — 99282 EMERGENCY DEPT VISIT SF MDM: CPT | Performed by: EMERGENCY MEDICINE

## 2019-06-14 PROCEDURE — 73650 X-RAY EXAM OF HEEL: CPT

## 2019-06-14 RX ORDER — NAPROXEN SODIUM 550 MG/1
550 TABLET ORAL 2 TIMES DAILY WITH MEALS
Qty: 10 TAB | Refills: 0 | Status: SHIPPED | OUTPATIENT
Start: 2019-06-14 | End: 2020-01-26

## 2019-06-14 NOTE — LETTER
[address] [facility] EMERGENCY DEPT 
[facility] [address] [address] 
[phone] Work/School Note Date: 6/14/2019 To Whom It May concern: 
 
Shama Bhatti was seen and treated today in the emergency room by the following provider(s): 
Attending Provider: Gianna Sousa MD.   
 
Shama Bhatti may return to work on 6/16. Sincerely, Daryle Fillers, MD

## 2019-06-14 NOTE — LETTER
[address] [facility] EMERGENCY DEPT 
[facility] [address] [place] [id number] 
[phone] Work/School Note Date: 6/14/2019 To Whom It May concern: 
 
Salma Castro was seen and treated today in the emergency room by the following provider(s): 
Attending Provider: Darryl Otto MD.   
 
Salma Castro may return to work on 6/16/2019.  
 
Sincerely, 
 
 
 
 
Pat Asher RN

## 2019-06-15 NOTE — ED NOTES
I have reviewed discharge instructions with the patient. The patient verbalized understanding. Patient left ED via Discharge Method: ambulatory to Home with friend. Opportunity for questions and clarification provided. Patient given 1 scripts. To continue your aftercare when you leave the hospital, you may receive an automated call from our care team to check in on how you are doing. This is a free service and part of our promise to provide the best care and service to meet your aftercare needs.  If you have questions, or wish to unsubscribe from this service please call [phone]. Thank you for Choosing our [facility] Emergency Department.

## 2019-06-15 NOTE — DISCHARGE INSTRUCTIONS
Try a half inch heel lift. Also cold packs to her 3 times a day for 15-20 minutes. If symptoms persist, call orthopedist or podiatrist recheck. Patient Education        Plantar Fasciitis: Care Instructions  Your Care Instructions    Plantar fasciitis is pain and inflammation of the plantar fascia, the tissue at the bottom of your foot that connects the heel bone to the toes. The plantar fascia also supports the arch. If you strain the plantar fascia, it can develop small tears and cause heel pain when you stand or walk. Plantar fasciitis can be caused by running or other sports. It also may occur in people who are overweight or who have high arches or flat feet. You may get plantar fasciitis if you walk or stand for long periods, or have a tight Achilles tendon or calf muscles. You can improve your foot pain with rest and other care at home. It might take a few weeks to a few months for your foot to heal completely. Follow-up care is a key part of your treatment and safety. Be sure to make and go to all appointments, and call your doctor if you are having problems. It's also a good idea to know your test results and keep a list of the medicines you take. How can you care for yourself at home? · Rest your feet often. Reduce your activity to a level that lets you avoid pain. If possible, do not run or walk on hard surfaces. · Take pain medicines exactly as directed. ? If the doctor gave you a prescription medicine for pain, take it as prescribed. ? If you are not taking a prescription pain medicine, take an over-the-counter anti-inflammatory medicine for pain and swelling, such as ibuprofen (Advil, Motrin) or naproxen (Aleve). Read and follow all instructions on the label. · Use ice massage to help with pain and swelling. You can use an ice cube or an ice cup several times a day. To make an ice cup, fill a paper cup with water and freeze it. Cut off the top of the cup until a half-inch of ice shows. Hold onto the remaining paper to use the cup. Rub the ice in small circles over the area for 5 to 7 minutes. · Contrast baths, which alternate hot and cold water, can also help reduce swelling. But because heat alone may make pain and swelling worse, end a contrast bath with a soak in cold water. · Wear a night splint if your doctor suggests it. A night splint holds your foot with the toes pointed up and the foot and ankle at a 90-degree angle. This position gives the bottom of your foot a constant, gentle stretch. · Do simple exercises such as calf stretches and towel stretches 2 to 3 times each day, especially when you first get up in the morning. These can help the plantar fascia become more flexible. They also make the muscles that support your arch stronger. Hold these stretches for 15 to 30 seconds per stretch. Repeat 2 to 4 times. ? Stand about 1 foot from a wall. Place the palms of both hands against the wall at chest level. Lean forward against the wall, keeping one leg with the knee straight and heel on the ground while bending the knee of the other leg.  ? Sit down on the floor or a mat with your feet stretched in front of you. Roll up a towel lengthwise, and loop it over the ball of your foot. Holding the towel at both ends, gently pull the towel toward you to stretch your foot. · Wear shoes with good arch support. Athletic shoes or shoes with a well-cushioned sole are good choices. · Try heel cups or shoe inserts (orthotics) to help cushion your heel. You can buy these at many shoe stores. · Put on your shoes as soon as you get out of bed. Going barefoot or wearing slippers may make your pain worse. · Reach and stay at a good weight for your height. This puts less strain on your feet. When should you call for help?   Call your doctor now or seek immediate medical care if:    · You have heel pain with fever, redness, or warmth in your heel.     · You cannot put weight on the sore foot.   Odilia Nieto closely for changes in your health, and be sure to contact your doctor if:    · You have numbness or tingling in your heel.     · Your heel pain lasts more than 2 weeks. Where can you learn more? Go to http://roxi-beatriz.info/. Kun Interiano in the search box to learn more about \"Plantar Fasciitis: Care Instructions. \"  Current as of: September 20, 2018  Content Version: 11.9  © 9269-4040 [employer]. Care instructions adapted under license by [employer] (which disclaims liability or warranty for this information). If you have questions about a medical condition or this instruction, always ask your healthcare professional. [place] [address] any warranty or liability for your use of this information.

## 2019-06-15 NOTE — ED PROVIDER NOTES
70-year-old male was on his feet 10 hours a day at work. He is one to 2 month history of increasing right heel pain is worse over the last 2 days. Some pain with weightbearing but mostly worse when he is off his feet. No trauma    The history is provided by the patient. Foot Pain    This is a new problem. The current episode started more than 1 week ago. The problem has been gradually worsening. The pain is present in the right foot. The quality of the pain is described as aching and dull. The pain is moderate. Pertinent negatives include no numbness, full range of motion and no back pain. He has tried nothing for the symptoms. There has been no history of extremity trauma. Past Medical History:   Diagnosis Date    [facility]        History reviewed. No pertinent surgical history. History reviewed. No pertinent family history.     Social History     Socioeconomic History    Marital status: SINGLE     Spouse name: Not on file    Number of children: Not on file    Years of education: Not on file    Highest education level: Not on file   Occupational History    Not on file   Social Needs    Financial resource strain: Not on file    Food insecurity:     Worry: Not on file     Inability: Not on file    Transportation needs:     Medical: Not on file     Non-medical: Not on file   Tobacco Use    Smoking status: Current Every Day Smoker     Packs/day: 0.50    Smokeless tobacco: Never Used   Substance and Sexual Activity    Alcohol use: Yes     Comment: 3-4 a week    Drug use: Yes     Types: Marijuana    Sexual activity: Yes     Partners: Female   Lifestyle    Physical activity:     Days per week: Not on file     Minutes per session: Not on file    Stress: Not on file   Relationships    Social connections:     Talks on phone: Not on file     Gets together: Not on file     Attends Christian service: Not on file     Active member of club or organization: Not on file     Attends meetings of clubs or organizations: Not on file     Relationship status: Not on file    Intimate partner violence:     Fear of current or ex partner: Not on file     Emotionally abused: Not on file     Physically abused: Not on file     Forced sexual activity: Not on file   Other Topics Concern    Not on file   Social History Narrative    Not on file         ALLERGIES: Patient has no known allergies. Review of Systems   Constitutional: Negative for chills and fever. Musculoskeletal: Negative for back pain. Neurological: Negative for weakness and numbness. Vitals:    06/14/19 2244   BP: (!) 173/103   Pulse: 84   Resp: 16   Temp: 98.5 °F (36.9 °C)   SpO2: 99%   Weight: 108.9 kg (240 lb)   Height: 5' 6\" (1.676 m)            Physical Exam   Constitutional: He appears well-developed and well-nourished. No distress. Musculoskeletal:        Right ankle: Normal.        Right foot: There is tenderness and bony tenderness. There is no swelling. Feet:    Skin: Skin is warm and dry. Nursing note and vitals reviewed. MDM  Number of Diagnoses or Management Options  Diagnosis management comments: Imaging to check for fracture. Otherwise suspect plantar fasciitis       Amount and/or Complexity of Data Reviewed  Tests in the radiology section of CPT®: reviewed and ordered    Risk of Complications, Morbidity, and/or Mortality  Presenting problems: moderate  Diagnostic procedures: minimal  Management options: low    Patient Progress  Patient progress: stable         Procedures    No results found.   Os calcinosis imaging negative per my interpretation

## 2019-06-15 NOTE — ED TRIAGE NOTES
Pt c/o pain in his right heel area for approx 2 months.   States that he is on his feet approx 10 hrs a day and has attempted to change the insoles of his shoes and OTC pain meds

## 2020-01-26 ENCOUNTER — HOSPITAL ENCOUNTER (EMERGENCY)
Age: 40
Discharge: HOME | End: 2020-01-26
Payer: COMMERCIAL

## 2020-01-26 VITALS
HEIGHT: 67 IN | SYSTOLIC BLOOD PRESSURE: 165 MMHG | TEMPERATURE: 98 F | WEIGHT: 240 LBS | OXYGEN SATURATION: 98 % | DIASTOLIC BLOOD PRESSURE: 88 MMHG | HEART RATE: 80 BPM | RESPIRATION RATE: 16 BRPM | BODY MASS INDEX: 37.67 KG/M2

## 2020-01-26 DIAGNOSIS — H10.32 ACUTE BACTERIAL CONJUNCTIVITIS OF LEFT EYE: Primary | ICD-10-CM

## 2020-01-26 DIAGNOSIS — L30.9 DERMATITIS: ICD-10-CM

## 2020-01-26 PROCEDURE — 99284 EMERGENCY DEPT VISIT MOD MDM: CPT

## 2020-01-26 RX ORDER — DIPHENHYDRAMINE HCL 25 MG
25 CAPSULE ORAL
Qty: 15 CAP | Refills: 0 | Status: SHIPPED | OUTPATIENT
Start: 2020-01-26 | End: 2020-12-08

## 2020-01-26 RX ORDER — HYDROCODONE BITARTRATE AND ACETAMINOPHEN 5; 325 MG/1; MG/1
1 TABLET ORAL
Qty: 6 TAB | Refills: 0 | Status: SHIPPED | OUTPATIENT
Start: 2020-01-26 | End: 2020-01-29

## 2020-01-26 RX ORDER — MUPIROCIN CALCIUM 20 MG/G
CREAM TOPICAL 2 TIMES DAILY
Qty: 15 G | Refills: 0 | Status: SHIPPED | OUTPATIENT
Start: 2020-01-26 | End: 2020-12-08

## 2020-01-26 RX ORDER — POLYMYXIN B SULFATE AND TRIMETHOPRIM 1; 10000 MG/ML; [USP'U]/ML
1 SOLUTION OPHTHALMIC EVERY 4 HOURS
Qty: 10 ML | Refills: 0 | Status: SHIPPED | OUTPATIENT
Start: 2020-01-26 | End: 2020-12-08

## 2020-01-26 NOTE — ED PROVIDER NOTES
Patient awoke with red eye left eye and drainage. Last night felt a little irritated he did not have any trauma to it this morning was matted shut with yellow discharge. Patient also asked to be evaluated a lesion on his forehead. Is been there for over a week and has a resendez crust to it    The history is provided by the patient. Red Eye    This is a new problem. The current episode started 3 to 5 hours ago. The problem occurs constantly. The problem has not changed since onset. The left eye is affected. The pain is at a severity of 8/10. Associated symptoms include negative. Past Medical History:   Diagnosis Date    HIV (human immunodeficiency virus infection) ([facility]        History reviewed. No pertinent surgical history. History reviewed. No pertinent family history.     Social History     Socioeconomic History    Marital status: SINGLE     Spouse name: Not on file    Number of children: Not on file    Years of education: Not on file    Highest education level: Not on file   Occupational History    Not on file   Social Needs    Financial resource strain: Not on file    Food insecurity:     Worry: Not on file     Inability: Not on file    Transportation needs:     Medical: Not on file     Non-medical: Not on file   Tobacco Use    Smoking status: Current Every Day Smoker     Packs/day: 0.50    Smokeless tobacco: Never Used   Substance and Sexual Activity    Alcohol use: Yes     Comment: 3-4 a week    Drug use: Yes     Types: Marijuana    Sexual activity: Yes     Partners: Female   Lifestyle    Physical activity:     Days per week: Not on file     Minutes per session: Not on file    Stress: Not on file   Relationships    Social connections:     Talks on phone: Not on file     Gets together: Not on file     Attends [employer] service: Not on file     Active member of club or organization: Not on file     Attends meetings of clubs or organizations: Not on file     Relationship status: Not on file    Intimate partner violence:     Fear of current or ex partner: Not on file     Emotionally abused: Not on file     Physically abused: Not on file     Forced sexual activity: Not on file   Other Topics Concern    Not on file   Social History Narrative    Not on file         ALLERGIES: Patient has no known allergies. Review of Systems   Constitutional: Negative. Negative for activity change. HENT: Negative. Eyes: Negative. Respiratory: Negative. Cardiovascular: Negative. Gastrointestinal: Negative. Genitourinary: Negative. Musculoskeletal: Negative. Skin: Negative. Neurological: Negative. Psychiatric/Behavioral: Negative. All other systems reviewed and are negative. Vitals:    01/26/20 0735 01/26/20 0745   BP: (!) 166/91    Pulse: 85    Resp: 16    Temp: 98.1 °F (36.7 °C)    SpO2: 98% 98%   Weight: 108.9 kg (240 lb)    Height: 5' 7\" (1.702 m)             Physical Exam  Vitals signs and nursing note reviewed. Constitutional:       General: He is not in acute distress. Appearance: He is well-developed. He is not diaphoretic. HENT:      Head: Normocephalic and atraumatic. Right Ear: External ear normal.      Left Ear: External ear normal.      Nose: Nose normal.      Mouth/Throat:      Pharynx: No oropharyngeal exudate. Eyes:      General: No scleral icterus. Right eye: Discharge present. Left eye: No discharge. Conjunctiva/sclera: Conjunctivae normal.      Pupils: Pupils are equal, round, and reactive to light. Neck:      Musculoskeletal: Normal range of motion and neck supple. Vascular: No JVD. Trachea: No tracheal deviation. Cardiovascular:      Rate and Rhythm: Normal rate and regular rhythm. Pulmonary:      Effort: Pulmonary effort is normal. No respiratory distress. Breath sounds: Normal breath sounds. No stridor. No wheezing. Chest:      Chest wall: No tenderness.    Abdominal:      General: Bowel sounds are normal. There is no distension. Palpations: Abdomen is soft. There is no mass. Tenderness: There is no tenderness. Musculoskeletal: Normal range of motion. General: No tenderness. Skin:     General: Skin is warm and dry. Coloration: Skin is not pale. Findings: Lesion present. No erythema or rash. Neurological:      Mental Status: He is alert and oriented to person, place, and time. Cranial Nerves: No cranial nerve deficit. Psychiatric:         Behavior: Behavior normal.         Thought Content: Thought content normal.          MDM  Number of Diagnoses or Management Options  Acute bacterial conjunctivitis of left eye: minor  Dermatitis: minor  Diagnosis management comments: No trauma to the eye no foreign body we will treat as bacterial conjunctivitis. He also has a small dime size lesion on his right forehead been there for over a week has a resendez crust to it could be bacterial dermatitis we will give him antibiotic cream.  Patient has no other signs of psoriasis or eczema on his body.          Procedures

## 2020-01-26 NOTE — LETTER
[address] [address] EMERGENCY DEPT 
[address] [facility] [id number] [phone] Work/School Note Date: 1/26/2020 To Whom It May concern: 
 
Angle Che was seen and treated today in the emergency room by the following provider(s): 
Attending Provider: Artemio Mcguire MD.   
 
Angle Che may return to work on 1/27/2020. Sincerely, Anand Muniz RN

## 2020-01-26 NOTE — ED NOTES
I have reviewed discharge instructions with the patient. The patient verbalized understanding. Patient left ED via Discharge Method: ambulatory to Home with self. Opportunity for questions and clarification provided. Patient given 4 scripts. To continue your aftercare when you leave the hospital, you may receive an automated call from our care team to check in on how you are doing. This is a free service and part of our promise to provide the best care and service to meet your aftercare needs.  If you have questions, or wish to unsubscribe from this service please call [phone]. Thank you for Choosing our [facility] Emergency Department.

## 2020-01-26 NOTE — DISCHARGE INSTRUCTIONS

## 2020-02-02 ENCOUNTER — HOSPITAL ENCOUNTER (EMERGENCY)
Age: 40
Discharge: HOME | End: 2020-02-02
Attending: EMERGENCY MEDICINE
Payer: COMMERCIAL

## 2020-02-02 VITALS
HEIGHT: 67 IN | OXYGEN SATURATION: 95 % | TEMPERATURE: 98.2 F | WEIGHT: 240 LBS | SYSTOLIC BLOOD PRESSURE: 163 MMHG | RESPIRATION RATE: 16 BRPM | BODY MASS INDEX: 37.67 KG/M2 | DIASTOLIC BLOOD PRESSURE: 99 MMHG | HEART RATE: 96 BPM

## 2020-02-02 DIAGNOSIS — J04.0 LARYNGITIS: Primary | ICD-10-CM

## 2020-02-02 PROCEDURE — 99282 EMERGENCY DEPT VISIT SF MDM: CPT

## 2020-02-02 RX ORDER — PREDNISONE 20 MG/1
20 TABLET ORAL DAILY
Qty: 5 TAB | Refills: 0 | Status: SHIPPED | OUTPATIENT
Start: 2020-02-02 | End: 2020-02-07

## 2020-02-02 NOTE — ED NOTES
I have reviewed discharge instructions with the patient. The patient verbalized understanding. Patient left ED via Discharge Method: ambulatory to Home with self. Opportunity for questions and clarification provided. Patient given 1 scripts. To continue your aftercare when you leave the hospital, you may receive an automated call from our care team to check in on how you are doing. This is a free service and part of our promise to provide the best care and service to meet your aftercare needs.  If you have questions, or wish to unsubscribe from this service please call [phone]. Thank you for Choosing our [facility] Emergency Department.

## 2020-02-02 NOTE — ED PROVIDER NOTES
Patient complains of hoarse voice started 2 days ago. He has a lot of \"postnasal drip\". Has not used anything over-the-counter to help symptoms he missed work today, no nvd    The history is provided by the patient. Hoarse    This is a new problem. The current episode started 2 days ago. The problem has been gradually worsening. There has been no fever. Associated symptoms include congestion. He has tried nothing for the symptoms. The treatment provided no relief. Past Medical History:   Diagnosis Date    HIV (human immunodeficiency virus infection) ([facility]        No past surgical history on file. No family history on file.     Social History     Socioeconomic History    Marital status: SINGLE     Spouse name: Not on file    Number of children: Not on file    Years of education: Not on file    Highest education level: Not on file   Occupational History    Not on file   Social Needs    Financial resource strain: Not on file    Food insecurity:     Worry: Not on file     Inability: Not on file    Transportation needs:     Medical: Not on file     Non-medical: Not on file   Tobacco Use    Smoking status: Current Every Day Smoker     Packs/day: 0.50    Smokeless tobacco: Never Used   Substance and Sexual Activity    Alcohol use: Yes     Comment: 3-4 a week    Drug use: Yes     Types: Marijuana    Sexual activity: Yes     Partners: Female   Lifestyle    Physical activity:     Days per week: Not on file     Minutes per session: Not on file    Stress: Not on file   Relationships    Social connections:     Talks on phone: Not on file     Gets together: Not on file     Attends Jewish service: Not on file     Active member of club or organization: Not on file     Attends meetings of clubs or organizations: Not on file     Relationship status: Not on file    Intimate partner violence:     Fear of current or ex partner: Not on file     Emotionally abused: Not on file     Physically abused: Not on file     Forced sexual activity: Not on file   Other Topics Concern    Not on file   Social History Narrative    Not on file         ALLERGIES: Patient has no known allergies. Review of Systems   HENT: Positive for congestion and hoarse voice. Vitals:    02/02/20 0951   BP: (!) 163/99   Pulse: 96   Resp: 16   Temp: 98.2 °F (36.8 °C)   SpO2: 95%   Weight: 108.9 kg (240 lb)   Height: 5' 7\" (1.702 m)            Physical Exam  Vitals signs and nursing note reviewed. Constitutional:       General: He is not in acute distress. Appearance: Normal appearance. He is well-developed and normal weight. He is not diaphoretic. HENT:      Head: Normocephalic and atraumatic. Right Ear: Tympanic membrane normal.      Left Ear: Tympanic membrane normal.      Nose: Congestion present. Mouth/Throat:      Mouth: Mucous membranes are moist.      Pharynx: No oropharyngeal exudate or posterior oropharyngeal erythema. Comments: Hoarse voice   Eyes:      Pupils: Pupils are equal, round, and reactive to light. Neck:      Musculoskeletal: Normal range of motion and neck supple. No neck rigidity or muscular tenderness. Cardiovascular:      Rate and Rhythm: Normal rate and regular rhythm. Pulmonary:      Effort: Pulmonary effort is normal.      Breath sounds: Normal breath sounds. No wheezing or rhonchi. Abdominal:      General: Bowel sounds are normal.      Palpations: Abdomen is soft. Musculoskeletal: Normal range of motion. Skin:     General: Skin is warm. Findings: No rash. Neurological:      General: No focal deficit present. Mental Status: He is alert and oriented to person, place, and time.    Psychiatric:         Mood and Affect: Mood normal.         Behavior: Behavior normal.          MDM  Number of Diagnoses or Management Options  Diagnosis management comments: Laryngitis  Will treat with prednisone, voice, blood pressure in the emergency room stressed to obtain primary care physician for routine follow-up       Amount and/or Complexity of Data Reviewed  Review and summarize past medical records: yes    Risk of Complications, Morbidity, and/or Mortality  Presenting problems: low  Diagnostic procedures: low  Management options: low    Patient Progress  Patient progress: improved         Procedures [facility]

## 2020-02-02 NOTE — LETTER
[address] [address] EMERGENCY DEPT 
[address] 
[place]ec[place] [id number] [phone] Work/School Note Date: 2/2/2020 To Whom It May concern: 
 
Juan Dumont was seen and treated today in the emergency room by the following provider(s): 
Attending Provider: Katheryn Leonardo MD 
Physician Assistant: JESSICA Cueva. Juan Dumont may return to work on 2-3-20. Sincerely, JESSICA Gifford

## 2020-02-02 NOTE — DISCHARGE INSTRUCTIONS
Use meds as directed along with over the counter cough cold meds, rest voice, call office to arrange follow up appt

## 2020-06-02 ENCOUNTER — APPOINTMENT (RX ONLY)
Dept: URBAN - METROPOLITAN AREA CLINIC 349 | Facility: CLINIC | Age: 40
Setting detail: DERMATOLOGY
End: 2020-06-02

## 2020-06-02 DIAGNOSIS — L98.8 OTHER SPECIFIED DISORDERS OF THE SKIN AND SUBCUTANEOUS TISSUE: ICD-10-CM

## 2020-06-02 DIAGNOSIS — L30.9 DERMATITIS, UNSPECIFIED: ICD-10-CM

## 2020-06-02 DIAGNOSIS — L60.8 OTHER NAIL DISORDERS: ICD-10-CM

## 2020-06-02 PROBLEM — L60.9 NAIL DISORDER, UNSPECIFIED: Status: ACTIVE | Noted: 2020-06-02

## 2020-06-02 PROCEDURE — ? TREATMENT REGIMEN

## 2020-06-02 PROCEDURE — ? OTHER

## 2020-06-02 PROCEDURE — ? PATIENT SPECIFIC COUNSELING

## 2020-06-02 PROCEDURE — ? COUNSELING

## 2020-06-02 PROCEDURE — ? RECOMMENDATIONS

## 2020-06-02 PROCEDURE — 99202 OFFICE O/P NEW SF 15 MIN: CPT

## 2020-06-02 PROCEDURE — ? PRESCRIPTION

## 2020-06-02 PROCEDURE — ? MEDICATION COUNSELING

## 2020-06-02 RX ORDER — FLUTICASONE PROPIONATE 0.05 MG/G
OINTMENT TOPICAL
Qty: 1 | Refills: 3 | Status: ERX | COMMUNITY
Start: 2020-06-02

## 2020-06-02 RX ORDER — ALCLOMETASONE DIPROPIONATE 0.5 MG/G
OINTMENT TOPICAL
Qty: 1 | Refills: 0 | Status: ERX | COMMUNITY
Start: 2020-06-02

## 2020-06-02 RX ADMIN — ALCLOMETASONE DIPROPIONATE: 0.5 OINTMENT TOPICAL at 00:00

## 2020-06-02 RX ADMIN — FLUTICASONE PROPIONATE: 0.05 OINTMENT TOPICAL at 00:00

## 2020-06-02 ASSESSMENT — LOCATION SIMPLE DESCRIPTION DERM
LOCATION SIMPLE: LEFT FOREARM
LOCATION SIMPLE: RIGHT FOREARM
LOCATION SIMPLE: RIGHT INDEX FINGER
LOCATION SIMPLE: LEFT MIDDLE FINGER
LOCATION SIMPLE: LEFT INDEX FINGER
LOCATION SIMPLE: RIGHT FOREHEAD
LOCATION SIMPLE: RIGHT MIDDLE FINGER

## 2020-06-02 ASSESSMENT — LOCATION DETAILED DESCRIPTION DERM
LOCATION DETAILED: LEFT VENTRAL PROXIMAL FOREARM
LOCATION DETAILED: LEFT MIDDLE FINGERNAIL
LOCATION DETAILED: RIGHT INDEX FINGERNAIL
LOCATION DETAILED: RIGHT MIDDLE FINGERNAIL
LOCATION DETAILED: RIGHT LATERAL FOREHEAD
LOCATION DETAILED: RIGHT VENTRAL PROXIMAL FOREARM
LOCATION DETAILED: LEFT INDEX FINGERNAIL

## 2020-06-02 ASSESSMENT — LOCATION ZONE DERM
LOCATION ZONE: FACE
LOCATION ZONE: FINGERNAIL
LOCATION ZONE: ARM

## 2020-06-02 NOTE — PROCEDURE: OTHER
Note Text (......Xxx Chief Complaint.): This diagnosis correlates with the
Detail Level: Detailed
Other (Free Text): Patient is here to discuss a rash. He states the rash is on both forearms and does not spread anywhere else. Patient states the rash is bumpy and itchy and sometimes he scratches until he bleeds. Patient states he had a patch of dry skin on his neck in the past and was given triamcinalone. He tried using that on his arms but it did not help. Patient denies history of eczema or psoriasis. Patient denies any new products or medications. Patient denies joint pain or fevers.\\n\\nPatient states he used the triamcinalone off and on for about one year. \\n\\nThe rash appears to be right where patient has tattoos. He has other tatoos on his body that there is no rash present around. Patient states he has had the tattoos for about two years but did get them all at different places. Vanesa states that she thinks he may be having an allergic reaction to the ink. \\n\\nClaire suggested that he try a different topical steroid and start a gentle skin regimen. Counseled on the risks and side effects. Counseled on risk of hypopigmentation. \\n\\nRecommended dove bar soap.\\n\\nPatient also complains of similar rash on truck and face. Nothing present today.
Other (Free Text): Patient denies family or personal history of melanoma.\\n\\nSpoke with dr Muñoz and we deciddd patient should bring in nail clippings for fungal culture. Will call patient to let him know \\n\\nSome Nails are benign melanonychia but some nails appear more fungal. If the dark nails culture negative for a fungus consider pseudomonas

## 2020-06-02 NOTE — HPI: NAIL DYSTROPHY
How Severe Is It?: mild
Is This A New Presentation, Or A Follow-Up?: Nail Dystrophy
Additional History: Patient is here to have nails looked at. Patient states that some of his nails are discolored and darker than the others. Patient denies any trauma to his nails and denies any exposure to chemicals. Patient states in the past a provider gave him a topical steroid to hopefully lighten the discoloration but he states it did not help. Patient denies family history of melanoma or psoriasis.

## 2020-06-02 NOTE — PROCEDURE: TREATMENT REGIMEN
Otc Regimen: Wash with dove soap \\nMoisturize with eucerin creams
Samples Given: Eucerin cream
Detail Level: Zone
Initiate Treatment: Fluticasone ointment apply to affected areas twice daily for two weeks for flares

## 2020-06-02 NOTE — PROCEDURE: MEDICATION COUNSELING
Xelhudsonz Pregnancy And Lactation Text: This medication is Pregnancy Category D and is not considered safe during pregnancy.  The risk during breast feeding is also uncertain.

## 2020-06-02 NOTE — HPI: RASH
Is The Patient Presenting As Previously Scheduled?: Yes
How Severe Is Your Rash?: mild
Is This A New Presentation, Or A Follow-Up?: Rash
Additional History: Patient is here to discuss a rash. He states the rash is on both forearms and does not spread anywhere else. Patient states the rash is bumpy and itchy and sometimes he scratches until he bleeds. Patient states he had a patch of dry skin on his neck in the past and was given triamcinalone. He tried using that on his arms but it did not help. Patient denies history of eczema or psoriasis. Patient denies any new products or medications. Patient denies joint pain or fevers.

## 2020-06-02 NOTE — PROCEDURE: MEDICATION COUNSELING
Bexarotene Pregnancy And Lactation Text: This medication is Pregnancy Category X and should not be given to women who are pregnant or may become pregnant. This medication should not be used if you are breast feeding. Consent (Near Eyelid Margin)/Introductory Paragraph: The rationale for Mohs was explained to the patient and consent was obtained. The risks, benefits and alternatives to therapy were discussed in detail. Specifically, the risks of ectropion or eyelid deformity, infection, scarring, bleeding, prolonged wound healing, incomplete removal, allergy to anesthesia, nerve injury and recurrence were addressed. Prior to the procedure, the treatment site was clearly identified and confirmed by the patient. All components of Universal Protocol/PAUSE Rule completed.

## 2020-06-02 NOTE — PROCEDURE: RECOMMENDATIONS
Recommendations (Free Text): Alclometasone apply to affected area on face twice daily for two weeks, then once daily for two weeks, then MWF.
Detail Level: Zone

## 2020-09-18 NOTE — PROCEDURE: MEDICATION COUNSELING
Hydroxyzine Counseling: Patient advised that the medication is sedating and not to drive a car after taking this medication.  Patient informed of potential adverse effects including but not limited to dry mouth, urinary retention, and blurry vision.  The patient verbalized understanding of the proper use and possible adverse effects of hydroxyzine.  All of the patient's questions and concerns were addressed. Interpolation Flap Text: A decision was made to reconstruct the defect utilizing an interpolation axial flap and a staged reconstruction.  A telfa template was made of the defect.  This telfa template was then used to outline the interpolation flap.  The donor area for the pedicle flap was then injected with anesthesia.  The flap was excised through the skin and subcutaneous tissue down to the layer of the underlying musculature.  The interpolation flap was carefully excised within this deep plane to maintain its blood supply.  The edges of the donor site were undermined.   The donor site was closed in a primary fashion.  The pedicle was then rotated into position and sutured.  Once the tube was sutured into place, adequate blood supply was confirmed with blanching and refill.  The pedicle was then wrapped with xeroform gauze and dressed appropriately with a telfa and gauze bandage to ensure continued blood supply and protect the attached pedicle.

## 2020-11-19 ENCOUNTER — HOSPITAL ENCOUNTER (EMERGENCY)
Age: 40
Discharge: HOME | End: 2020-11-19
Attending: EMERGENCY MEDICINE
Payer: COMMERCIAL

## 2020-11-19 VITALS
OXYGEN SATURATION: 98 % | SYSTOLIC BLOOD PRESSURE: 235 MMHG | HEIGHT: 66 IN | TEMPERATURE: 98.6 F | DIASTOLIC BLOOD PRESSURE: 93 MMHG | BODY MASS INDEX: 39.37 KG/M2 | HEART RATE: 83 BPM | RESPIRATION RATE: 18 BRPM | WEIGHT: 245 LBS

## 2020-11-19 DIAGNOSIS — F10.10 ALCOHOL USE DISORDER, MILD, ABUSE: Primary | ICD-10-CM

## 2020-11-19 LAB
AMPHET UR QL SCN: NEGATIVE
BARBITURATES UR QL SCN: NEGATIVE
BENZODIAZ UR QL: NEGATIVE
CANNABINOIDS UR QL SCN: NEGATIVE
COCAINE UR QL SCN: NEGATIVE
METHADONE UR QL: NEGATIVE
OPIATES UR QL: NEGATIVE
PCP UR QL: NEGATIVE

## 2020-11-19 PROCEDURE — 80307 DRUG TEST PRSMV CHEM ANLYZR: CPT

## 2020-11-19 PROCEDURE — 99283 EMERGENCY DEPT VISIT LOW MDM: CPT

## 2020-11-19 NOTE — DISCHARGE INSTRUCTIONS
As we discussed, try to limit your alcohol intake  Follow-up with a primary care physician  Follow-up with outpatient drug treatment centers  Return To the ER for any new, worsening or life-threatening symptoms    Learning About Alcohol Use Disorder  What is alcohol use disorder? Alcohol use disorder means that a person drinks alcohol even though it causes harm to themselves or others. It can range from mild to severe. The more signs of this disorder you have, the more severe it may be. Moderate to severe alcohol use disorder is sometimes called addiction. People who have it may find it hard to control their use of alcohol. People who have this disorder may argue with others about how much they're drinking. Their job may be affected because of drinking. They may drink when it's dangerous or illegal, such as when they drive. They also may have a strong need, or craving, to drink. They may feel like they must drink just to get by. Their drinking may increase their risk of getting hurt or being in a car crash. Over time, drinking too much alcohol may cause health problems. These may include high blood pressure, liver problems, or problems with digestion. What are the signs? Maybe you've wondered about your alcohol habits, or how to tell if your drinking is becoming a problem. Here are some of the signs of alcohol use disorder. You may have it if you have two or more of the following signs:  · You drink larger amounts of alcohol than you ever meant to. Or you've been drinking for a longer time than you ever meant to. · You can't cut down or control your use. Or you constantly wish you could cut down. · You spend a lot of time getting or drinking alcohol or recovering from its effects. · You have strong cravings for alcohol. · You can no longer do your main jobs at work, at school, or at home. · You keep drinking alcohol, even though your use hurts your relationships.   · You have stopped doing important activities because of your alcohol use. · You drink alcohol in situations where doing so is dangerous. · You keep drinking alcohol even though you know it's causing health problems. · You need more and more alcohol to get the same effect, or you get less effect from the same amount over time. This is called tolerance. · You have uncomfortable symptoms when you stop drinking alcohol or use less. This is called withdrawal.  Alcohol use disorder can range from mild to severe. The more signs you have, the more severe the disorder may be. Moderate to severe alcohol use disorder is sometimes called addiction. You might not realize that your drinking is a problem. You might not drink large amounts when you drink. Or you might go for days or weeks between drinking episodes. But even if you don't drink very often, your drinking could still be harmful and put you at risk. How is alcohol use disorder treated? Getting help is up to you. But you don't have to do it alone. There are many people and kinds of treatments that can help. Treatment for alcohol use disorder can include:  · Group therapy, one or more types of counseling, and alcohol education. · Medicines that help to:  ? Reduce withdrawal symptoms and help you safely stop drinking. ? Reduce cravings for alcohol. · Support groups. These groups include Alcoholics Anonymous and [employer] (Self-Management and Recovery Training). Some people are able to stop or cut back on drinking with help from a counselor. People who have moderate to severe alcohol use disorder may need medical treatment. They may need to stay in a hospital or treatment center. You may have a treatment team to help you. This team may include a psychologist or psychiatrist, counselors, doctors, social workers, nurses, and a . A  helps plan and manage your treatment. Follow-up care is a key part of your treatment and safety.  Be sure to make and go to all appointments, and call your doctor if you are having problems. It's also a good idea to know your test results and keep a list of the medicines you take. Where can you learn more? Go to http://www.gray.com/  Enter H758 in the search box to learn more about \"Learning About Alcohol Use Disorder. \"  Current as of: June 29, 2020               Content Version: 12.6  © 2006-2020 [employer]. Care instructions adapted under license by [employer] (which disclaims liability or warranty for this information). If you have questions about a medical condition or this instruction, always ask your healthcare professional. [address] any warranty or liability for your use of this information.

## 2020-11-19 NOTE — ED NOTES
I have reviewed discharge instructions with the patient. The patient verbalized understanding. Patient left ED via Discharge Method: ambulatory to Home with self. Opportunity for questions and clarification provided. Patient given 0 scripts. No esign       To continue your aftercare when you leave the hospital, you may receive an automated call from our care team to check in on how you are doing. This is a free service and part of our promise to provide the best care and service to meet your aftercare needs.  If you have questions, or wish to unsubscribe from this service please call [phone]. Thank you for Choosing our [facility] Emergency Department.
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
## 2020-12-08 ENCOUNTER — HOSPITAL ENCOUNTER (EMERGENCY)
Age: 40
Discharge: HOME | End: 2020-12-08
Attending: EMERGENCY MEDICINE
Payer: COMMERCIAL

## 2020-12-08 VITALS
RESPIRATION RATE: 14 BRPM | SYSTOLIC BLOOD PRESSURE: 154 MMHG | WEIGHT: 250 LBS | OXYGEN SATURATION: 97 % | DIASTOLIC BLOOD PRESSURE: 95 MMHG | HEIGHT: 67 IN | BODY MASS INDEX: 39.24 KG/M2 | HEART RATE: 72 BPM | TEMPERATURE: 98.6 F

## 2020-12-08 DIAGNOSIS — H10.33 ACUTE BACTERIAL CONJUNCTIVITIS OF BOTH EYES: Primary | ICD-10-CM

## 2020-12-08 PROCEDURE — 99282 EMERGENCY DEPT VISIT SF MDM: CPT

## 2020-12-08 RX ORDER — POLYMYXIN B SULFATE AND TRIMETHOPRIM 1; 10000 MG/ML; [USP'U]/ML
1 SOLUTION OPHTHALMIC EVERY 4 HOURS
Qty: 10 ML | Refills: 0 | Status: SHIPPED | OUTPATIENT
Start: 2020-12-08 | End: 2021-05-05

## 2020-12-08 NOTE — ED TRIAGE NOTES
Wore his contacts yesterday and today he woke up with eyes red and irritated. He noticed that his eyes where hurting and the sun light really irritates his eyes. Sclera is red bilaterally.

## 2020-12-08 NOTE — LETTER
[id number] [address] EMERGENCY DEPT 
[address] 
[facility] [address] 
[phone] Work/School Note Date: 12/8/2020 To Whom It May concern: 
 
Atul Sterling was seen and treated today in the emergency room by the following provider(s): 
Attending Provider: Brandi Manuel MD 
Physician Assistant: JESSICA Liriano. Atul Sterling is excused from work/school on 12/08/20 and 12/09/20. He is medically clear to return to work/school on 12/10/2020. Sincerely, JESSICA Spangler

## 2020-12-08 NOTE — ED PROVIDER NOTES
Patient states he wears fashion contact lenses and yesterday while at work he started noticing some itching and irritation to the right eye he removed the contact and it was torn and subsequently started having similar irritation to the left eye and remove that contact. He woke this morning with redness in both eyes no visual changes but he is light sensitive no fever no drainage. Patient normally does not wear any corrective lenses    The history is provided by the patient. Eye Pain    This is a new problem. The current episode started 12 to 24 hours ago. The problem occurs rarely. The problem has been gradually worsening. Both eyes are affected. The injury mechanism was contact lenses. The pain is at a severity of 4/10. The pain is mild. There is no history of trauma to the eye. There is no known exposure to pink eye. He wears contacts (fashion only ). Associated symptoms include pain. He has tried nothing for the symptoms. The treatment provided no relief. Past Medical History:   Diagnosis Date    HIV (human immunodeficiency virus infection) ([facility] [address]        No past surgical history on file. No family history on file.     Social History     Socioeconomic History    Marital status: SINGLE     Spouse name: Not on file    Number of children: Not on file    Years of education: Not on file    Highest education level: Not on file   Occupational History    Not on file   Social Needs    Financial resource strain: Not on file    Food insecurity     Worry: Not on file     Inability: Not on file    Transportation needs     Medical: Not on file     Non-medical: Not on file   Tobacco Use    Smoking status: Current Every Day Smoker     Packs/day: 0.50    Smokeless tobacco: Never Used   Substance and Sexual Activity    Alcohol use: Yes     Comment: 3-4 a week    Drug use: Yes     Types: Marijuana    Sexual activity: Yes     Partners: Female   Lifestyle    Physical activity     Days per week: Not on file Minutes per session: Not on file    Stress: Not on file   Relationships    Social connections     Talks on phone: Not on file     Gets together: Not on file     Attends Muslim service: Not on file     Active member of club or organization: Not on file     Attends meetings of clubs or organizations: Not on file     Relationship status: Not on file    Intimate partner violence     Fear of current or ex partner: Not on file     Emotionally abused: Not on file     Physically abused: Not on file     Forced sexual activity: Not on file   Other Topics Concern    Not on file   Social History Narrative    Not on file         ALLERGIES: Patient has no known allergies. Review of Systems   Eyes: Positive for pain. All other systems reviewed and are negative. Vitals:    12/08/20 1204   BP: (!) 154/95   Pulse: 72   Resp: 14   Temp: 98.6 °F (37 °C)   SpO2: 97%   Weight: 113.4 kg (250 lb)   Height: 5' 7\" (1.702 m)            Physical Exam  Vitals signs and nursing note reviewed. Constitutional:       General: He is not in acute distress. Appearance: Normal appearance. He is well-developed. He is obese. He is not diaphoretic. HENT:      Head: Normocephalic and atraumatic. Eyes:      Extraocular Movements: Extraocular movements intact. Pupils: Pupils are equal, round, and reactive to light. Comments: Both eyes with reddened conjunctive a no drainage no hyphema noted no lid swelling   Neck:      Musculoskeletal: Normal range of motion and neck supple. Cardiovascular:      Rate and Rhythm: Normal rate and regular rhythm. Pulmonary:      Effort: Pulmonary effort is normal.      Breath sounds: Normal breath sounds. Abdominal:      General: Bowel sounds are normal.      Palpations: Abdomen is soft. Musculoskeletal: Normal range of motion. Skin:     General: Skin is warm. Neurological:      Mental Status: He is alert and oriented to person, place, and time.           MDM  Number of Diagnoses or Management Options  Diagnosis management comments: bilateral conjunctivitis from fashion contact lens wearing will place warm compresses and Polytrim drops stressed not to use any more contact lenses       Amount and/or Complexity of Data Reviewed  Review and summarize past medical records: yes    Risk of Complications, Morbidity, and/or Mortality  Presenting problems: low  Diagnostic procedures: low  Management options: low    Patient Progress  Patient progress: improved         Procedures

## 2020-12-08 NOTE — ED NOTES
I have reviewed discharge instructions with the patient. The patient verbalized understanding. Patient left ED via private vehicle. Discharge Method: ambulatory to Home with self. Opportunity for questions and clarification provided. Patient given 1 scripts. To continue your aftercare when you leave the hospital, you may receive an automated call from our care team to check in on how you are doing. This is a free service and part of our promise to provide the best care and service to meet your aftercare needs.  If you have questions, or wish to unsubscribe from this service please call [phone]. Thank you for Choosing our [facility] Emergency Department.

## 2020-12-08 NOTE — DISCHARGE INSTRUCTIONS

## 2021-04-30 ENCOUNTER — APPOINTMENT (OUTPATIENT)
Dept: GENERAL RADIOLOGY | Age: 41
End: 2021-04-30
Attending: EMERGENCY MEDICINE
Payer: COMMERCIAL

## 2021-04-30 ENCOUNTER — HOSPITAL ENCOUNTER (EMERGENCY)
Age: 41
Discharge: HOME | End: 2021-04-30
Attending: EMERGENCY MEDICINE
Payer: COMMERCIAL

## 2021-04-30 VITALS
DIASTOLIC BLOOD PRESSURE: 75 MMHG | HEIGHT: 67 IN | SYSTOLIC BLOOD PRESSURE: 145 MMHG | OXYGEN SATURATION: 98 % | BODY MASS INDEX: 39.24 KG/M2 | WEIGHT: 250 LBS | TEMPERATURE: 103 F | HEART RATE: 96 BPM | RESPIRATION RATE: 20 BRPM

## 2021-04-30 DIAGNOSIS — R19.7 DIARRHEA OF PRESUMED INFECTIOUS ORIGIN: Primary | ICD-10-CM

## 2021-04-30 DIAGNOSIS — E83.42 HYPOMAGNESEMIA: ICD-10-CM

## 2021-04-30 DIAGNOSIS — E87.6 HYPOKALEMIA: ICD-10-CM

## 2021-04-30 LAB
ALBUMIN SERPL-MCNC: 3.8 G/DL (ref 3.5–5)
ALBUMIN/GLOB SERPL: 1.1 {RATIO} (ref 1.2–3.5)
ALP SERPL-CCNC: 128 U/L (ref 50–136)
ALT SERPL-CCNC: 43 U/L (ref 12–65)
ANION GAP SERPL CALC-SCNC: 9 MMOL/L (ref 7–16)
AST SERPL-CCNC: 20 U/L (ref 15–37)
ATRIAL RATE: 109 BPM
BACTERIA URNS QL MICRO: 0 /HPF
BASOPHILS # BLD: 0.1 K/UL (ref 0–0.2)
BASOPHILS NFR BLD: 0 % (ref 0–2)
BILIRUB SERPL-MCNC: 0.6 MG/DL (ref 0.2–1.1)
BUN SERPL-MCNC: 6 MG/DL (ref 6–23)
CALCIUM SERPL-MCNC: 8.2 MG/DL (ref 8.3–10.4)
CALCULATED P AXIS, ECG09: 42 DEGREES
CALCULATED R AXIS, ECG10: 16 DEGREES
CALCULATED T AXIS, ECG11: 25 DEGREES
CASTS URNS QL MICRO: NORMAL /LPF
CHLORIDE SERPL-SCNC: 105 MMOL/L (ref 98–107)
CO2 SERPL-SCNC: 25 MMOL/L (ref 21–32)
COVID-19 RAPID TEST, COVR: NOT DETECTED
CREAT SERPL-MCNC: 0.96 MG/DL (ref 0.8–1.5)
DIAGNOSIS, 93000: NORMAL
DIFFERENTIAL METHOD BLD: ABNORMAL
EOSINOPHIL # BLD: 0 K/UL (ref 0–0.8)
EOSINOPHIL NFR BLD: 0 % (ref 0.5–7.8)
EPI CELLS #/AREA URNS HPF: NORMAL /HPF
ERYTHROCYTE [DISTWIDTH] IN BLOOD BY AUTOMATED COUNT: 12.7 % (ref 11.9–14.6)
GLOBULIN SER CALC-MCNC: 3.6 G/DL (ref 2.3–3.5)
GLUCOSE SERPL-MCNC: 111 MG/DL (ref 65–100)
HCT VFR BLD AUTO: 45.2 % (ref 41.1–50.3)
HGB BLD-MCNC: 14.6 G/DL (ref 13.6–17.2)
IMM GRANULOCYTES # BLD AUTO: 0.2 K/UL (ref 0–0.5)
IMM GRANULOCYTES NFR BLD AUTO: 1 % (ref 0–5)
LIPASE SERPL-CCNC: 74 U/L (ref 73–393)
LYMPHOCYTES # BLD: 1.3 K/UL (ref 0.5–4.6)
LYMPHOCYTES NFR BLD: 8 % (ref 13–44)
MAGNESIUM SERPL-MCNC: 1.7 MG/DL (ref 1.8–2.4)
MCH RBC QN AUTO: 29.9 PG (ref 26.1–32.9)
MCHC RBC AUTO-ENTMCNC: 32.3 G/DL (ref 31.4–35)
MCV RBC AUTO: 92.6 FL (ref 79.6–97.8)
MONOCYTES # BLD: 1.5 K/UL (ref 0.1–1.3)
MONOCYTES NFR BLD: 9 % (ref 4–12)
NEUTS SEG # BLD: 14 K/UL (ref 1.7–8.2)
NEUTS SEG NFR BLD: 82 % (ref 43–78)
NRBC # BLD: 0 K/UL (ref 0–0.2)
P-R INTERVAL, ECG05: 154 MS
PLATELET # BLD AUTO: 228 K/UL (ref 150–450)
PMV BLD AUTO: 10.4 FL (ref 9.4–12.3)
POTASSIUM SERPL-SCNC: 3.4 MMOL/L (ref 3.5–5.1)
PROT SERPL-MCNC: 7.4 G/DL (ref 6.3–8.2)
Q-T INTERVAL, ECG07: 316 MS
QRS DURATION, ECG06: 78 MS
QTC CALCULATION (BEZET), ECG08: 425 MS
RBC # BLD AUTO: 4.88 M/UL (ref 4.23–5.6)
RBC #/AREA URNS HPF: NORMAL /HPF
SARS-COV-2, COV2: NORMAL
SODIUM SERPL-SCNC: 139 MMOL/L (ref 136–145)
SOURCE, COVRS: NORMAL
VENTRICULAR RATE, ECG03: 109 BPM
WBC # BLD AUTO: 17.1 K/UL (ref 4.3–11.1)
WBC URNS QL MICRO: NORMAL /HPF

## 2021-04-30 PROCEDURE — 85025 COMPLETE CBC W/AUTO DIFF WBC: CPT

## 2021-04-30 PROCEDURE — 74011250637 HC RX REV CODE- 250/637: Performed by: EMERGENCY MEDICINE

## 2021-04-30 PROCEDURE — 71045 X-RAY EXAM CHEST 1 VIEW: CPT

## 2021-04-30 PROCEDURE — U0005 INFEC AGEN DETEC AMPLI PROBE: HCPCS

## 2021-04-30 PROCEDURE — 80053 COMPREHEN METABOLIC PANEL: CPT

## 2021-04-30 PROCEDURE — 99285 EMERGENCY DEPT VISIT HI MDM: CPT

## 2021-04-30 PROCEDURE — 87635 SARS-COV-2 COVID-19 AMP PRB: CPT

## 2021-04-30 PROCEDURE — 81003 URINALYSIS AUTO W/O SCOPE: CPT

## 2021-04-30 PROCEDURE — 81015 MICROSCOPIC EXAM OF URINE: CPT

## 2021-04-30 PROCEDURE — 96375 TX/PRO/DX INJ NEW DRUG ADDON: CPT

## 2021-04-30 PROCEDURE — 83735 ASSAY OF MAGNESIUM: CPT

## 2021-04-30 PROCEDURE — 74011250636 HC RX REV CODE- 250/636: Performed by: EMERGENCY MEDICINE

## 2021-04-30 PROCEDURE — 83690 ASSAY OF LIPASE: CPT

## 2021-04-30 PROCEDURE — 96365 THER/PROPH/DIAG IV INF INIT: CPT

## 2021-04-30 PROCEDURE — 93005 ELECTROCARDIOGRAM TRACING: CPT | Performed by: EMERGENCY MEDICINE

## 2021-04-30 RX ORDER — MAGNESIUM SULFATE HEPTAHYDRATE 40 MG/ML
2 INJECTION, SOLUTION INTRAVENOUS
Status: COMPLETED | OUTPATIENT
Start: 2021-04-30 | End: 2021-04-30

## 2021-04-30 RX ORDER — ONDANSETRON 2 MG/ML
4 INJECTION INTRAMUSCULAR; INTRAVENOUS
Status: COMPLETED | OUTPATIENT
Start: 2021-04-30 | End: 2021-04-30

## 2021-04-30 RX ORDER — HYOSCYAMINE SULFATE 0.12 MG/1
0.12 TABLET SUBLINGUAL
Qty: 12 TAB | Status: SHIPPED | OUTPATIENT
Start: 2021-04-30 | End: 2021-05-05 | Stop reason: SDUPTHER

## 2021-04-30 RX ORDER — ACETAMINOPHEN 500 MG
1000 TABLET ORAL
Status: COMPLETED | OUTPATIENT
Start: 2021-04-30 | End: 2021-04-30

## 2021-04-30 RX ORDER — POTASSIUM CHLORIDE 20 MEQ/1
40 TABLET, EXTENDED RELEASE ORAL
Status: COMPLETED | OUTPATIENT
Start: 2021-04-30 | End: 2021-04-30

## 2021-04-30 RX ORDER — ONDANSETRON 8 MG/1
8 TABLET, ORALLY DISINTEGRATING ORAL
Qty: 12 TAB | Refills: 0 | Status: ON HOLD | OUTPATIENT
Start: 2021-04-30 | End: 2021-05-06

## 2021-04-30 RX ORDER — SODIUM CHLORIDE 9 MG/ML
1000 INJECTION, SOLUTION INTRAVENOUS ONCE
Status: COMPLETED | OUTPATIENT
Start: 2021-04-30 | End: 2021-04-30

## 2021-04-30 RX ADMIN — SODIUM CHLORIDE 1000 ML: 900 INJECTION, SOLUTION INTRAVENOUS at 08:06

## 2021-04-30 RX ADMIN — SODIUM CHLORIDE 1000 ML: 900 INJECTION, SOLUTION INTRAVENOUS at 09:31

## 2021-04-30 RX ADMIN — ACETAMINOPHEN 1000 MG: 500 TABLET, FILM COATED ORAL at 08:12

## 2021-04-30 RX ADMIN — POTASSIUM CHLORIDE 40 MEQ: 1500 TABLET, EXTENDED RELEASE ORAL at 09:34

## 2021-04-30 RX ADMIN — MAGNESIUM SULFATE HEPTAHYDRATE 2 G: 40 INJECTION, SOLUTION INTRAVENOUS at 09:32

## 2021-04-30 RX ADMIN — ONDANSETRON 4 MG: 2 INJECTION INTRAMUSCULAR; INTRAVENOUS at 08:12

## 2021-04-30 NOTE — DISCHARGE INSTRUCTIONS
Nausea medications as directed. Levsin for cramping if needed as directed. Tylenol and Motrin alternated every 3-4 hours for fever and muscle aches. OTC pedialyte or Gatoraide G2-small amounts frequently (1-2 swallows) every 5 minutes to prevent dehydration. Advance diet as tolerated. No milk or dairy until diarrhea is resolved. Follow up with your Dr or the Dr provided in 3-4 days if not improving. Return if dehydration-no urine for 12 hours, dry mouth and unable to keep fluids down or any concerns.

## 2021-04-30 NOTE — ED PROVIDER NOTES
The history is provided by the patient. Diarrhea   This is a new problem. The current episode started 2 days ago. The problem occurs constantly. The problem has not changed since onset. Associated with: Cough sore throat muscle aches nausea and vomiting. Pain location: No abdominal pain. Associated symptoms include a fever, diarrhea, nausea, vomiting, myalgias and back pain. Pertinent negatives include no hematochezia, no melena, no dysuria, no frequency, no hematuria and no chest pain. Nothing worsens the pain. The pain is relieved by nothing. Past medical history comments: HIV, undetectable viral load. The patient's surgical history non-contributory. Past Medical History:   Diagnosis Date    [facility]        History reviewed. No pertinent surgical history. History reviewed. No pertinent family history.     Social History     Socioeconomic History    Marital status: SINGLE     Spouse name: Not on file    Number of children: Not on file    Years of education: Not on file    Highest education level: Not on file   Occupational History    Not on file   Social Needs    Financial resource strain: Not on file    Food insecurity     Worry: Not on file     Inability: Not on file    Transportation needs     Medical: Not on file     Non-medical: Not on file   Tobacco Use    Smoking status: Current Every Day Smoker     Packs/day: 0.50    Smokeless tobacco: Never Used   Substance and Sexual Activity    Alcohol use: Yes     Comment: 3-4 a week    Drug use: Yes     Types: Marijuana    Sexual activity: Yes     Partners: Female   Lifestyle    Physical activity     Days per week: Not on file     Minutes per session: Not on file    Stress: Not on file   Relationships    Social connections     Talks on phone: Not on file     Gets together: Not on file     Attends Oriental orthodox service: Not on file     Active member of club or organization: Not on file     Attends meetings of clubs or organizations: Not on file     Relationship status: Not on file    Intimate partner violence     Fear of current or ex partner: Not on file     Emotionally abused: Not on file     Physically abused: Not on file     Forced sexual activity: Not on file   Other Topics Concern    Not on file   Social History Narrative    Not on file         ALLERGIES: Patient has no known allergies. Review of Systems   Constitutional: Positive for fever. HENT: Positive for congestion and sore throat. Respiratory: Positive for cough and shortness of breath. Cardiovascular: Negative for chest pain. Gastrointestinal: Positive for diarrhea, nausea and vomiting. Negative for hematochezia and melena. Endocrine: Negative for polydipsia and polyuria. Genitourinary: Negative for dysuria, frequency and hematuria. Musculoskeletal: Positive for back pain and myalgias. Skin: Negative for color change and rash. Neurological: Negative for weakness and numbness. Psychiatric/Behavioral: Negative for confusion. All other systems reviewed and are negative. Vitals:    04/30/21 0749   BP: (!) 156/84   Pulse: (!) 122   Resp: 20   Temp: (!) 103 °F (39.4 °C)   SpO2: 97%   Weight: 113.4 kg (250 lb)   Height: 5' 7\" (1.702 m)            Physical Exam  Vitals signs and nursing note reviewed. Constitutional:       General: He is not in acute distress. Appearance: He is ill-appearing. He is not toxic-appearing. HENT:      Head: Normocephalic and atraumatic. Nose: Nose normal.      Mouth/Throat:      Mouth: Mucous membranes are moist.   Neck:      Musculoskeletal: Neck supple. Cardiovascular:      Rate and Rhythm: Normal rate and regular rhythm. Heart sounds: Normal heart sounds. Pulmonary:      Effort: Pulmonary effort is normal.      Breath sounds: Normal breath sounds. Abdominal:      General: There is no distension. Palpations: Abdomen is soft. Tenderness:  There is no abdominal tenderness. Musculoskeletal: Normal range of motion. Lymphadenopathy:      Cervical: No cervical adenopathy. Skin:     General: Skin is warm and dry. Neurological:      General: No focal deficit present. Mental Status: He is alert. Cranial Nerves: No cranial nerve deficit. Sensory: No sensory deficit. Motor: No weakness. Coordination: Coordination normal.          MDM  Number of Diagnoses or Management Options  Diarrhea of presumed infectious origin  Hypokalemia  Hypomagnesemia  Diagnosis management comments: Test for coronavirus. X-ray to rule out pneumonia. Hydrate and medicate and treat symptoms. Abdomen benign. Diarrhea nonbloody. Vomiting nonbloody and nonbilious. 8:36 AM  X-ray negative for pneumonia. 10:34 AM  Labs show mild hypokalemia and mild hypomagnesemia both being replaced now. Leukocytosis present consistent with his fever and GI symptoms. Likely GI virus. Rapid coronavirus test is negative. Abdomen is soft nontender and benign and therefore I do not think CT scan imaging is indicated. Discharge home with oral rehydration instructions, Zofran for nausea and vomiting. Over-the-counter Imodium for diarrhea. Tylenol and Motrin for fever. 10:40 AM  Patient feels improved.   Patient no longer tachycardic       Amount and/or Complexity of Data Reviewed  Clinical lab tests: ordered and reviewed (Results for orders placed or performed during the hospital encounter of 04/30/21  -COVID-19 RAPID TEST       Result                      Value             Ref Range           Specimen source                                               Nasopharyngeal       COVID-19 rapid test         Not detected      NOTD           -CBC WITH AUTOMATED DIFF       Result                      Value             Ref Range           WBC                         17.1 (H)          4.3 - 11.1 K*       RBC                         4.88              4.23 - 5.6 M*       HGB 14.6              13.6 - 17.2 *       HCT                         45.2              41.1 - 50.3 %       MCV                         92.6              79.6 - 97.8 *       MCH                         29.9              26.1 - 32.9 *       MCHC                        32.3              31.4 - 35.0 *       RDW                         12.7              11.9 - 14.6 %       PLATELET                    228               150 - 450 K/*       MPV                         10.4              9.4 - 12.3 FL       ABSOLUTE NRBC               0.00              0.0 - 0.2 K/*       DF                          AUTOMATED                             NEUTROPHILS                 82 (H)            43 - 78 %           LYMPHOCYTES                 8 (L)             13 - 44 %           MONOCYTES                   9                 4.0 - 12.0 %        EOSINOPHILS                 0 (L)             0.5 - 7.8 %         BASOPHILS                   0                 0.0 - 2.0 %         IMMATURE GRANULOCYTES       1                 0.0 - 5.0 %         ABS. NEUTROPHILS            14.0 (H)          1.7 - 8.2 K/*       ABS. LYMPHOCYTES            1.3               0.5 - 4.6 K/*       ABS. MONOCYTES              1.5 (H)           0.1 - 1.3 K/*       ABS. EOSINOPHILS            0.0               0.0 - 0.8 K/*       ABS. BASOPHILS              0.1               0.0 - 0.2 K/*       ABS. IMM.  GRANS.            0.2               0.0 - 0.5 K/*  -METABOLIC PANEL, COMPREHENSIVE       Result                      Value             Ref Range           Sodium                      139               136 - 145 mm*       Potassium                   3.4 (L)           3.5 - 5.1 mm*       Chloride                    105               98 - 107 mmo*       CO2                         25                21 - 32 mmol*       Anion gap                   9                 7 - 16 mmol/L       Glucose                     111 (H)           65 - 100 mg/*       BUN 6                 6 - 23 MG/DL        Creatinine                  0.96              0.8 - 1.5 MG*       GFR est AA                  >60               >60 ml/min/1*       GFR est non-AA              >60               >60 ml/min/1*       Calcium                     8.2 (L)           8.3 - 10.4 M*       Bilirubin, total            0.6               0.2 - 1.1 MG*       ALT (SGPT)                  43                12 - 65 U/L         AST (SGOT)                  20                15 - 37 U/L         Alk.  phosphatase            128               50 - 136 U/L        Protein, total              7.4               6.3 - 8.2 g/*       Albumin                     3.8               3.5 - 5.0 g/*       Globulin                    3.6 (H)           2.3 - 3.5 g/*       A-G Ratio                   1.1 (L)           1.2 - 3.5      -MAGNESIUM       Result                      Value             Ref Range           Magnesium                   1.7 (L)           1.8 - 2.4 mg*  -LIPASE       Result                      Value             Ref Range           Lipase                      74                73 - 393 U/L   -SARS-COV-2       Result                      Value             Ref Range           SARS-CoV-2                                                    Please find results under separate order  -URINE MICROSCOPIC       Result                      Value             Ref Range           WBC                         0-3               0 /hpf              RBC                         0-3               0 /hpf              Epithelial cells            0-3               0 /hpf              Bacteria                    0                 0 /hpf              Casts                       0-3               0 /lpf         -EKG, 12 LEAD, INITIAL       Result                      Value             Ref Range           Ventricular Rate            109               BPM                 Atrial Rate                 109               BPM P-R Interval                154               ms                  QRS Duration                78                ms                  Q-T Interval                316               ms                  QTC Calculation (Bezet)     425               ms                  Calculated P Axis           42                degrees             Calculated R Axis           16                degrees             Calculated T Axis           25                degrees             Diagnosis                                                     !! AGE AND GENDER SPECIFIC ECG ANALYSIS !! Sinus tachycardia   Minimal voltage criteria for LVH, may be normal variant   Borderline ECG   When compared with ECG of 13-FEB-2018 12:32,   No significant change was found     )  Tests in the radiology section of CPT®: ordered and reviewed (Xr Chest Port    Result Date: 4/30/2021  EXAM: XR CHEST PORT INDICATION: cough COMPARISON: None. FINDINGS: A portable AP radiograph of the chest was obtained at 0807 hours. The patient is on a cardiac monitor. The lungs are clear. The cardiac and mediastinal contours and pulmonary vascularity are normal.  The bones and soft tissues are grossly within normal limits. Normal chest.    )  Tests in the medicine section of CPT®: ordered and reviewed  Independent visualization of images, tracings, or specimens: yes (EKG interpretation  EKG shows a sinus tachycardia with borderline LVH by voltage. Normal axis. No acute ST-T changes. No ST depression or ST elevation. No STEMI.   Read by ED physician Dr. Faith Stanford)    Risk of Complications, Morbidity, and/or Mortality  Presenting problems: moderate  Diagnostic procedures: low  Management options: low    Patient Progress  Patient progress: stable         Procedures

## 2021-04-30 NOTE — ED NOTES
I have reviewed discharge instructions with the patient. The patient verbalized understanding. Patient left ED via Discharge Method: ambulatory to Home with (self). Opportunity for questions and clarification provided. Patient given 2 scripts. No esign       To continue your aftercare when you leave the hospital, you may receive an automated call from our care team to check in on how you are doing. This is a free service and part of our promise to provide the best care and service to meet your aftercare needs.  If you have questions, or wish to unsubscribe from this service please call [phone]. Thank you for Choosing our [facility] Emergency Department.

## 2021-04-30 NOTE — ED TRIAGE NOTES
Patient arrives from home mask in place c/o x 2-3 days of body aches, nausea vomiting, and diarrhea.

## 2021-05-01 LAB
SARS-COV-2, COV2: NOT DETECTED
SPECIMEN SOURCE, FCOV2M: NORMAL

## 2021-05-05 ENCOUNTER — APPOINTMENT (OUTPATIENT)
Dept: CT IMAGING | Age: 41
DRG: 872 | End: 2021-05-05
Attending: EMERGENCY MEDICINE
Payer: COMMERCIAL

## 2021-05-05 ENCOUNTER — HOSPITAL ENCOUNTER (EMERGENCY)
Age: 41
Discharge: HOME | DRG: 872 | End: 2021-05-05
Attending: EMERGENCY MEDICINE
Payer: COMMERCIAL

## 2021-05-05 ENCOUNTER — APPOINTMENT (OUTPATIENT)
Dept: GENERAL RADIOLOGY | Age: 41
DRG: 872 | End: 2021-05-05
Attending: EMERGENCY MEDICINE
Payer: COMMERCIAL

## 2021-05-05 VITALS
OXYGEN SATURATION: 97 % | TEMPERATURE: 99.4 F | WEIGHT: 250 LBS | BODY MASS INDEX: 39.24 KG/M2 | RESPIRATION RATE: 20 BRPM | DIASTOLIC BLOOD PRESSURE: 77 MMHG | HEART RATE: 92 BPM | HEIGHT: 67 IN | SYSTOLIC BLOOD PRESSURE: 148 MMHG

## 2021-05-05 DIAGNOSIS — K52.9 COLITIS: Primary | ICD-10-CM

## 2021-05-05 LAB
ALBUMIN SERPL-MCNC: 3.3 G/DL (ref 3.5–5)
ALBUMIN/GLOB SERPL: 0.7 {RATIO} (ref 1.2–3.5)
ALP SERPL-CCNC: 113 U/L (ref 50–136)
ALT SERPL-CCNC: 29 U/L (ref 12–65)
ANION GAP SERPL CALC-SCNC: 8 MMOL/L (ref 7–16)
AST SERPL-CCNC: 12 U/L (ref 15–37)
ATRIAL RATE: 105 BPM
BACTERIA SPEC CULT: NORMAL
BACTERIA URNS QL MICRO: 0 /HPF
BASOPHILS # BLD: 0.1 K/UL (ref 0–0.2)
BASOPHILS NFR BLD: 1 % (ref 0–2)
BILIRUB SERPL-MCNC: 0.4 MG/DL (ref 0.2–1.1)
BUN SERPL-MCNC: 6 MG/DL (ref 6–23)
CALCIUM SERPL-MCNC: 8.7 MG/DL (ref 8.3–10.4)
CALCULATED P AXIS, ECG09: 57 DEGREES
CALCULATED R AXIS, ECG10: 35 DEGREES
CALCULATED T AXIS, ECG11: 26 DEGREES
CASTS URNS QL MICRO: NORMAL /LPF
CHLORIDE SERPL-SCNC: 100 MMOL/L (ref 98–107)
CO2 SERPL-SCNC: 29 MMOL/L (ref 21–32)
CREAT SERPL-MCNC: 0.93 MG/DL (ref 0.8–1.5)
DIAGNOSIS, 93000: NORMAL
DIFFERENTIAL METHOD BLD: ABNORMAL
EOSINOPHIL # BLD: 0.1 K/UL (ref 0–0.8)
EOSINOPHIL NFR BLD: 1 % (ref 0.5–7.8)
EPI CELLS #/AREA URNS HPF: NORMAL /HPF
ERYTHROCYTE [DISTWIDTH] IN BLOOD BY AUTOMATED COUNT: 12.1 % (ref 11.9–14.6)
GLOBULIN SER CALC-MCNC: 4.6 G/DL (ref 2.3–3.5)
GLUCOSE SERPL-MCNC: 135 MG/DL (ref 65–100)
HCT VFR BLD AUTO: 44.5 % (ref 41.1–50.3)
HGB BLD-MCNC: 14.6 G/DL (ref 13.6–17.2)
IMM GRANULOCYTES # BLD AUTO: 0.3 K/UL (ref 0–0.5)
IMM GRANULOCYTES NFR BLD AUTO: 2 % (ref 0–5)
LACTATE SERPL-SCNC: 2 MMOL/L (ref 0.4–2)
LYMPHOCYTES # BLD: 2.5 K/UL (ref 0.5–4.6)
LYMPHOCYTES NFR BLD: 19 % (ref 13–44)
MCH RBC QN AUTO: 29.5 PG (ref 26.1–32.9)
MCHC RBC AUTO-ENTMCNC: 32.8 G/DL (ref 31.4–35)
MCV RBC AUTO: 89.9 FL (ref 79.6–97.8)
MONOCYTES # BLD: 2.1 K/UL (ref 0.1–1.3)
MONOCYTES NFR BLD: 16 % (ref 4–12)
NEUTS SEG # BLD: 7.9 K/UL (ref 1.7–8.2)
NEUTS SEG NFR BLD: 61 % (ref 43–78)
NRBC # BLD: 0 K/UL (ref 0–0.2)
P-R INTERVAL, ECG05: 150 MS
PLATELET # BLD AUTO: 277 K/UL (ref 150–450)
PLATELET COMMENTS,PCOM: ADEQUATE
PMV BLD AUTO: 10.2 FL (ref 9.4–12.3)
POTASSIUM SERPL-SCNC: 2.7 MMOL/L (ref 3.5–5.1)
PROT SERPL-MCNC: 7.9 G/DL (ref 6.3–8.2)
Q-T INTERVAL, ECG07: 380 MS
QRS DURATION, ECG06: 82 MS
QTC CALCULATION (BEZET), ECG08: 502 MS
RBC # BLD AUTO: 4.95 M/UL (ref 4.23–5.6)
RBC #/AREA URNS HPF: NORMAL /HPF
RBC MORPH BLD: ABNORMAL
SERVICE CMNT-IMP: NORMAL
SODIUM SERPL-SCNC: 137 MMOL/L (ref 136–145)
VENTRICULAR RATE, ECG03: 105 BPM
WBC # BLD AUTO: 13 K/UL (ref 4.3–11.1)
WBC MORPH BLD: ABNORMAL
WBC URNS QL MICRO: NORMAL /HPF

## 2021-05-05 PROCEDURE — 85025 COMPLETE CBC W/AUTO DIFF WBC: CPT

## 2021-05-05 PROCEDURE — 74176 CT ABD & PELVIS W/O CONTRAST: CPT

## 2021-05-05 PROCEDURE — 74011250637 HC RX REV CODE- 250/637: Performed by: EMERGENCY MEDICINE

## 2021-05-05 PROCEDURE — 87150 DNA/RNA AMPLIFIED PROBE: CPT

## 2021-05-05 PROCEDURE — 74011250636 HC RX REV CODE- 250/636: Performed by: EMERGENCY MEDICINE

## 2021-05-05 PROCEDURE — 93005 ELECTROCARDIOGRAM TRACING: CPT | Performed by: EMERGENCY MEDICINE

## 2021-05-05 PROCEDURE — 81015 MICROSCOPIC EXAM OF URINE: CPT

## 2021-05-05 PROCEDURE — 87493 C DIFF AMPLIFIED PROBE: CPT

## 2021-05-05 PROCEDURE — 96361 HYDRATE IV INFUSION ADD-ON: CPT

## 2021-05-05 PROCEDURE — 71045 X-RAY EXAM CHEST 1 VIEW: CPT

## 2021-05-05 PROCEDURE — 87205 SMEAR GRAM STAIN: CPT

## 2021-05-05 PROCEDURE — 83605 ASSAY OF LACTIC ACID: CPT

## 2021-05-05 PROCEDURE — 80053 COMPREHEN METABOLIC PANEL: CPT

## 2021-05-05 PROCEDURE — 96374 THER/PROPH/DIAG INJ IV PUSH: CPT

## 2021-05-05 PROCEDURE — 81003 URINALYSIS AUTO W/O SCOPE: CPT

## 2021-05-05 PROCEDURE — 99285 EMERGENCY DEPT VISIT HI MDM: CPT

## 2021-05-05 PROCEDURE — 87040 BLOOD CULTURE FOR BACTERIA: CPT

## 2021-05-05 RX ORDER — HYOSCYAMINE SULFATE 0.12 MG/1
0.12 TABLET SUBLINGUAL
Qty: 20 TAB | Status: SHIPPED | OUTPATIENT
Start: 2021-05-05 | End: 2021-05-10

## 2021-05-05 RX ORDER — ACETAMINOPHEN 500 MG
1000 TABLET ORAL
Status: COMPLETED | OUTPATIENT
Start: 2021-05-05 | End: 2021-05-05

## 2021-05-05 RX ORDER — CEFDINIR 300 MG/1
300 CAPSULE ORAL 2 TIMES DAILY
Qty: 14 CAP | Refills: 0 | Status: SHIPPED | OUTPATIENT
Start: 2021-05-05 | End: 2021-12-05

## 2021-05-05 RX ORDER — ONDANSETRON 4 MG/1
4 TABLET, FILM COATED ORAL
Qty: 15 TAB | Refills: 0 | Status: SHIPPED | OUTPATIENT
Start: 2021-05-05 | End: 2021-12-05

## 2021-05-05 RX ORDER — METRONIDAZOLE 500 MG/1
500 TABLET ORAL 2 TIMES DAILY
Qty: 14 TAB | Refills: 0 | Status: SHIPPED | OUTPATIENT
Start: 2021-05-05 | End: 2021-05-12

## 2021-05-05 RX ORDER — MORPHINE SULFATE 4 MG/ML
4 INJECTION INTRAVENOUS
Status: COMPLETED | OUTPATIENT
Start: 2021-05-05 | End: 2021-05-05

## 2021-05-05 RX ADMIN — ACETAMINOPHEN 1000 MG: 500 TABLET, FILM COATED ORAL at 08:18

## 2021-05-05 RX ADMIN — POTASSIUM BICARBONATE 40 MEQ: 391 TABLET, EFFERVESCENT ORAL at 09:14

## 2021-05-05 RX ADMIN — MORPHINE SULFATE 4 MG: 4 INJECTION INTRAVENOUS at 08:19

## 2021-05-05 RX ADMIN — SODIUM CHLORIDE, SODIUM LACTATE, POTASSIUM CHLORIDE, AND CALCIUM CHLORIDE 1000 ML: 600; 310; 30; 20 INJECTION, SOLUTION INTRAVENOUS at 07:57

## 2021-05-05 NOTE — ED NOTES
Report to AMITA Grover. Pt has just returned from CT. Placed back on all monitors. IVFs continue to infuse.

## 2021-05-05 NOTE — ED PROVIDER NOTES
Presents with complaint of left lower quadrant pain and diarrhea and dysuria. Patient states he has been having diarrhea or the urge to go to the bathroom more than 10 times a day. He denies any recent antibiotics. He denies any blood in his stool. Symptoms have been there for 1 week. He had a T-max of 103. He has been taking Motrin and Tylenol. He has a history of HIV and his last viral count was undetectable. He does not know his last CD4 count. He has never had a UTI and he denies any penile discharge. He denies any recent or previous abdominal surgeries. No tobacco or alcohol. The history is provided by the patient. Flank Pain   This is a new problem. The current episode started more than 2 days ago. The problem has been gradually worsening. The problem occurs constantly. The pain is associated with no known injury. Associated symptoms include a fever, abdominal pain and dysuria. He has tried analgesics and NSAIDs for the symptoms. The treatment provided no relief. Past Medical History:   Diagnosis Date    [facility]        History reviewed. No pertinent surgical history. History reviewed. No pertinent family history.     Social History     Socioeconomic History    Marital status: SINGLE     Spouse name: Not on file    Number of children: Not on file    Years of education: Not on file    Highest education level: Not on file   Occupational History    Not on file   Social Needs    Financial resource strain: Not on file    Food insecurity     Worry: Not on file     Inability: Not on file    Transportation needs     Medical: Not on file     Non-medical: Not on file   Tobacco Use    Smoking status: Current Every Day Smoker     Packs/day: 0.25    Smokeless tobacco: Never Used   Substance and Sexual Activity    Alcohol use: Yes     Comment: 3-4 a week    Drug use: Yes     Types: Marijuana    Sexual activity: Yes     Partners: Female Lifestyle    Physical activity     Days per week: Not on file     Minutes per session: Not on file    Stress: Not on file   Relationships    Social connections     Talks on phone: Not on file     Gets together: Not on file     Attends Judaism service: Not on file     Active member of club or organization: Not on file     Attends meetings of clubs or organizations: Not on file     Relationship status: Not on file    Intimate partner violence     Fear of current or ex partner: Not on file     Emotionally abused: Not on file     Physically abused: Not on file     Forced sexual activity: Not on file   Other Topics Concern    Not on file   Social History Narrative    Not on file         ALLERGIES: Patient has no known allergies. Review of Systems   Constitutional: Positive for chills and fever. Respiratory: Negative for shortness of breath and wheezing. Gastrointestinal: Positive for abdominal pain. Genitourinary: Positive for dysuria and flank pain. Skin: Negative for rash and wound. All other systems reviewed and are negative. Vitals:    05/05/21 0903 05/05/21 0930 05/05/21 1000 05/05/21 1300   BP:  (!) 154/90 130/75 (!) 148/77   Pulse:  93 89 92   Resp:  22 18 20   Temp:       SpO2: 96% 97% 98% 97%   Weight:       Height:                Physical Exam  Vitals signs and nursing note reviewed. Constitutional:       Appearance: Normal appearance. He is well-developed. He is not diaphoretic. HENT:      Head: Normocephalic and atraumatic. Mouth/Throat:      Mouth: Mucous membranes are dry. Eyes:      General:         Right eye: No discharge. Left eye: No discharge. Conjunctiva/sclera: Conjunctivae normal.   Neck:      Musculoskeletal: Normal range of motion and neck supple. Cardiovascular:      Rate and Rhythm: Normal rate and regular rhythm. Pulmonary:      Effort: Pulmonary effort is normal. No respiratory distress. Breath sounds: Normal breath sounds. Abdominal:      General: There is no distension. Palpations: Abdomen is soft. Tenderness: There is abdominal tenderness (LLQ). Musculoskeletal: Normal range of motion. Right lower leg: No edema. Left lower leg: No edema. Skin:     General: Skin is warm and dry. Capillary Refill: Capillary refill takes less than 2 seconds. Neurological:      General: No focal deficit present. Mental Status: He is alert and oriented to person, place, and time. Cranial Nerves: No cranial nerve deficit. Psychiatric:         Mood and Affect: Mood normal.         Behavior: Behavior normal.          MDM  Number of Diagnoses or Management Options  Colitis  Diagnosis management comments: Patient's disposition was delayed secondary to waiting for C. difficile PCR. CT shows colitis. C. difficile was negative. Patient was treated with cefdinir and Flagyl and given Levsin to go home with. Patient is to return for worsening symptoms. He felt better.        Amount and/or Complexity of Data Reviewed  Clinical lab tests: ordered and reviewed  Tests in the radiology section of CPT®: ordered and reviewed  Review and summarize past medical records: yes (Several recent visits)  Discuss the patient with other providers: yes  Independent visualization of images, tracings, or specimens: yes    Risk of Complications, Morbidity, and/or Mortality  Presenting problems: high  Diagnostic procedures: minimal  Management options: moderate    Patient Progress  Patient progress: improved         Procedures

## 2021-05-05 NOTE — ED NOTES
I have reviewed discharge instructions with the patient. The patient verbalized understanding. Patient left ED via Discharge Method: ambulatory to Home with self. Opportunity for questions and clarification provided. Patient given 4 scripts. To continue your aftercare when you leave the hospital, you may receive an automated call from our care team to check in on how you are doing. This is a free service and part of our promise to provide the best care and service to meet your aftercare needs.  If you have questions, or wish to unsubscribe from this service please call [phone]. Thank you for Choosing our [place] Emergency Department.

## 2021-05-05 NOTE — ED TRIAGE NOTES
Pt presents to ED c/o LLQ abdominal. Shooting. Urinary frequency/straining. Diarrhea. Intermittent fever. Taking tylenol/motrin but none yet today. HIV Positive.

## 2021-05-05 NOTE — ED TRIAGE NOTES
Pt to ED via EMS for right flank pain. Pt advises painful urination. Per wife pt has been doing meth, cbd and ativan. Masked.

## 2021-05-06 ENCOUNTER — HOSPITAL ENCOUNTER (INPATIENT)
Age: 41
LOS: 4 days | Discharge: HOME | DRG: 872 | End: 2021-05-10
Attending: EMERGENCY MEDICINE | Admitting: FAMILY MEDICINE
Payer: COMMERCIAL

## 2021-05-06 DIAGNOSIS — K52.9 COLITIS, ACUTE: ICD-10-CM

## 2021-05-06 DIAGNOSIS — A41.51 SEPSIS DUE TO ESCHERICHIA COLI WITHOUT ACUTE ORGAN DYSFUNCTION (HCC): Primary | ICD-10-CM

## 2021-05-06 PROBLEM — B20 HIV (HUMAN IMMUNODEFICIENCY VIRUS INFECTION) (HCC): Status: ACTIVE | Noted: 2021-05-06

## 2021-05-06 PROBLEM — R78.81 BACTEREMIA: Status: ACTIVE | Noted: 2021-05-06

## 2021-05-06 PROBLEM — E87.6 HYPOKALEMIA: Status: ACTIVE | Noted: 2021-05-06

## 2021-05-06 LAB
ACC. NO. FROM MICRO ORDER, ACCP: ABNORMAL
ANION GAP SERPL CALC-SCNC: 4 MMOL/L (ref 7–16)
BASOPHILS # BLD: 0.1 K/UL (ref 0–0.2)
BASOPHILS NFR BLD: 1 % (ref 0–2)
BUN SERPL-MCNC: 4 MG/DL (ref 6–23)
CALCIUM SERPL-MCNC: 7.8 MG/DL (ref 8.3–10.4)
CHLORIDE SERPL-SCNC: 102 MMOL/L (ref 98–107)
CO2 SERPL-SCNC: 33 MMOL/L (ref 21–32)
CREAT SERPL-MCNC: 0.82 MG/DL (ref 0.8–1.5)
DIFFERENTIAL METHOD BLD: ABNORMAL
EOSINOPHIL # BLD: 0.2 K/UL (ref 0–0.8)
EOSINOPHIL NFR BLD: 2 % (ref 0.5–7.8)
ERYTHROCYTE [DISTWIDTH] IN BLOOD BY AUTOMATED COUNT: 12 % (ref 11.9–14.6)
ESCHERICHIA COLI: DETECTED
GLUCOSE SERPL-MCNC: 102 MG/DL (ref 65–100)
HCT VFR BLD AUTO: 39.3 % (ref 41.1–50.3)
HGB BLD-MCNC: 12.9 G/DL (ref 13.6–17.2)
IMM GRANULOCYTES # BLD AUTO: 0.3 K/UL (ref 0–0.5)
IMM GRANULOCYTES NFR BLD AUTO: 3 % (ref 0–5)
INTERPRETATION: ABNORMAL
KPC (CARBAPENEM RESISTANCE GENE): NOT DETECTED
LACTATE SERPL-SCNC: 0.8 MMOL/L (ref 0.4–2)
LYMPHOCYTES # BLD: 2.3 K/UL (ref 0.5–4.6)
LYMPHOCYTES NFR BLD: 23 % (ref 13–44)
MAGNESIUM SERPL-MCNC: 1.9 MG/DL (ref 1.8–2.4)
MCH RBC QN AUTO: 29.8 PG (ref 26.1–32.9)
MCHC RBC AUTO-ENTMCNC: 32.8 G/DL (ref 31.4–35)
MCV RBC AUTO: 90.8 FL (ref 79.6–97.8)
MONOCYTES # BLD: 1.6 K/UL (ref 0.1–1.3)
MONOCYTES NFR BLD: 16 % (ref 4–12)
NEUTS SEG # BLD: 5.7 K/UL (ref 1.7–8.2)
NEUTS SEG NFR BLD: 55 % (ref 43–78)
NRBC # BLD: 0 K/UL (ref 0–0.2)
PLATELET # BLD AUTO: 225 K/UL (ref 150–450)
PLATELET COMMENTS,PCOM: ADEQUATE
PMV BLD AUTO: 10.5 FL (ref 9.4–12.3)
POTASSIUM SERPL-SCNC: 2.5 MMOL/L (ref 3.5–5.1)
RBC # BLD AUTO: 4.33 M/UL (ref 4.23–5.6)
RBC MORPH BLD: ABNORMAL
SODIUM SERPL-SCNC: 139 MMOL/L (ref 136–145)
WBC # BLD AUTO: 10.2 K/UL (ref 4.3–11.1)
WBC MORPH BLD: ABNORMAL

## 2021-05-06 PROCEDURE — 96375 TX/PRO/DX INJ NEW DRUG ADDON: CPT

## 2021-05-06 PROCEDURE — 74011000258 HC RX REV CODE- 258: Performed by: FAMILY MEDICINE

## 2021-05-06 PROCEDURE — 65660000000 HC RM CCU STEPDOWN

## 2021-05-06 PROCEDURE — 74011250636 HC RX REV CODE- 250/636: Performed by: EMERGENCY MEDICINE

## 2021-05-06 PROCEDURE — 74011000258 HC RX REV CODE- 258: Performed by: EMERGENCY MEDICINE

## 2021-05-06 PROCEDURE — 74011250637 HC RX REV CODE- 250/637: Performed by: FAMILY MEDICINE

## 2021-05-06 PROCEDURE — 96365 THER/PROPH/DIAG IV INF INIT: CPT

## 2021-05-06 PROCEDURE — 83605 ASSAY OF LACTIC ACID: CPT

## 2021-05-06 PROCEDURE — 80048 BASIC METABOLIC PNL TOTAL CA: CPT

## 2021-05-06 PROCEDURE — 83735 ASSAY OF MAGNESIUM: CPT

## 2021-05-06 PROCEDURE — 99284 EMERGENCY DEPT VISIT MOD MDM: CPT

## 2021-05-06 PROCEDURE — 85025 COMPLETE CBC W/AUTO DIFF WBC: CPT

## 2021-05-06 PROCEDURE — 74011250636 HC RX REV CODE- 250/636: Performed by: FAMILY MEDICINE

## 2021-05-06 RX ORDER — ONDANSETRON 2 MG/ML
4 INJECTION INTRAMUSCULAR; INTRAVENOUS
Status: COMPLETED | OUTPATIENT
Start: 2021-05-06 | End: 2021-05-06

## 2021-05-06 RX ORDER — ZOLPIDEM TARTRATE 5 MG/1
5 TABLET ORAL ONCE
Status: COMPLETED | OUTPATIENT
Start: 2021-05-07 | End: 2021-05-07

## 2021-05-06 RX ORDER — POLYETHYLENE GLYCOL 3350 17 G/17G
17 POWDER, FOR SOLUTION ORAL DAILY PRN
Status: DISCONTINUED | OUTPATIENT
Start: 2021-05-06 | End: 2021-05-10 | Stop reason: HOSPADM

## 2021-05-06 RX ORDER — MORPHINE SULFATE 2 MG/ML
1 INJECTION, SOLUTION INTRAMUSCULAR; INTRAVENOUS
Status: DISCONTINUED | OUTPATIENT
Start: 2021-05-06 | End: 2021-05-10 | Stop reason: HOSPADM

## 2021-05-06 RX ORDER — ACETAMINOPHEN 650 MG/1
650 SUPPOSITORY RECTAL
Status: DISCONTINUED | OUTPATIENT
Start: 2021-05-06 | End: 2021-05-10 | Stop reason: HOSPADM

## 2021-05-06 RX ORDER — MAGNESIUM SULFATE HEPTAHYDRATE 40 MG/ML
2 INJECTION, SOLUTION INTRAVENOUS ONCE
Status: DISCONTINUED | OUTPATIENT
Start: 2021-05-06 | End: 2021-05-06

## 2021-05-06 RX ORDER — SODIUM CHLORIDE 0.9 % (FLUSH) 0.9 %
5-40 SYRINGE (ML) INJECTION EVERY 8 HOURS
Status: DISCONTINUED | OUTPATIENT
Start: 2021-05-06 | End: 2021-05-10 | Stop reason: HOSPADM

## 2021-05-06 RX ORDER — FAMOTIDINE 20 MG/1
20 TABLET, FILM COATED ORAL 2 TIMES DAILY
Status: DISCONTINUED | OUTPATIENT
Start: 2021-05-06 | End: 2021-05-10 | Stop reason: HOSPADM

## 2021-05-06 RX ORDER — HYDROCODONE BITARTRATE AND ACETAMINOPHEN 5; 325 MG/1; MG/1
1 TABLET ORAL
Status: DISCONTINUED | OUTPATIENT
Start: 2021-05-06 | End: 2021-05-10 | Stop reason: HOSPADM

## 2021-05-06 RX ORDER — PROMETHAZINE HYDROCHLORIDE 25 MG/1
12.5 TABLET ORAL
Status: DISCONTINUED | OUTPATIENT
Start: 2021-05-06 | End: 2021-05-10 | Stop reason: HOSPADM

## 2021-05-06 RX ORDER — ENOXAPARIN SODIUM 100 MG/ML
40 INJECTION SUBCUTANEOUS DAILY
Status: DISCONTINUED | OUTPATIENT
Start: 2021-05-07 | End: 2021-05-09

## 2021-05-06 RX ORDER — SODIUM CHLORIDE 0.9 % (FLUSH) 0.9 %
5-40 SYRINGE (ML) INJECTION AS NEEDED
Status: DISCONTINUED | OUTPATIENT
Start: 2021-05-06 | End: 2021-05-10 | Stop reason: HOSPADM

## 2021-05-06 RX ORDER — ONDANSETRON 2 MG/ML
4 INJECTION INTRAMUSCULAR; INTRAVENOUS
Status: DISCONTINUED | OUTPATIENT
Start: 2021-05-06 | End: 2021-05-10 | Stop reason: HOSPADM

## 2021-05-06 RX ORDER — POTASSIUM CHLORIDE 20 MEQ/1
40 TABLET, EXTENDED RELEASE ORAL EVERY 4 HOURS
Status: COMPLETED | OUTPATIENT
Start: 2021-05-06 | End: 2021-05-07

## 2021-05-06 RX ORDER — LANOLIN ALCOHOL/MO/W.PET/CERES
400 CREAM (GRAM) TOPICAL 2 TIMES DAILY
Status: DISCONTINUED | OUTPATIENT
Start: 2021-05-06 | End: 2021-05-10 | Stop reason: HOSPADM

## 2021-05-06 RX ORDER — POTASSIUM CHLORIDE AND SODIUM CHLORIDE 900; 300 MG/100ML; MG/100ML
INJECTION, SOLUTION INTRAVENOUS CONTINUOUS
Status: DISCONTINUED | OUTPATIENT
Start: 2021-05-06 | End: 2021-05-08

## 2021-05-06 RX ORDER — ACETAMINOPHEN 325 MG/1
650 TABLET ORAL
Status: DISCONTINUED | OUTPATIENT
Start: 2021-05-06 | End: 2021-05-10 | Stop reason: HOSPADM

## 2021-05-06 RX ORDER — KETOROLAC TROMETHAMINE 30 MG/ML
15 INJECTION, SOLUTION INTRAMUSCULAR; INTRAVENOUS
Status: COMPLETED | OUTPATIENT
Start: 2021-05-06 | End: 2021-05-06

## 2021-05-06 RX ADMIN — Medication 5 ML: at 15:45

## 2021-05-06 RX ADMIN — SODIUM CHLORIDE 1000 ML: 900 INJECTION, SOLUTION INTRAVENOUS at 13:27

## 2021-05-06 RX ADMIN — FAMOTIDINE 20 MG: 20 TABLET ORAL at 17:50

## 2021-05-06 RX ADMIN — CEFTRIAXONE 1 G: 1 INJECTION, POWDER, FOR SOLUTION INTRAMUSCULAR; INTRAVENOUS at 13:28

## 2021-05-06 RX ADMIN — POTASSIUM CHLORIDE 40 MEQ: 1500 TABLET, EXTENDED RELEASE ORAL at 16:37

## 2021-05-06 RX ADMIN — POTASSIUM CHLORIDE AND SODIUM CHLORIDE 1000 ML: 900; 300 INJECTION, SOLUTION INTRAVENOUS at 18:05

## 2021-05-06 RX ADMIN — KETOROLAC TROMETHAMINE 15 MG: 30 INJECTION, SOLUTION INTRAMUSCULAR; INTRAVENOUS at 13:46

## 2021-05-06 RX ADMIN — MORPHINE SULFATE 1 MG: 2 INJECTION, SOLUTION INTRAMUSCULAR; INTRAVENOUS at 16:38

## 2021-05-06 RX ADMIN — ONDANSETRON 4 MG: 2 INJECTION INTRAMUSCULAR; INTRAVENOUS at 13:46

## 2021-05-06 RX ADMIN — MAGNESIUM GLUCONATE 500 MG ORAL TABLET 400 MG: 500 TABLET ORAL at 16:37

## 2021-05-06 RX ADMIN — PIPERACILLIN SODIUM AND TAZOBACTAM SODIUM 4.5 G: 4; .5 INJECTION, POWDER, LYOPHILIZED, FOR SOLUTION INTRAVENOUS at 17:51

## 2021-05-06 NOTE — ED TRIAGE NOTES
Pt states he received a phone call this morning to return to the ED for positive blood cultures. Pt states he took tylenol around 090 this morning. Pt c/o low abdominal pain with sharp pains on the left side. Pt states he continues to have diarrhea, states he vomited yesterday but not today.

## 2021-05-06 NOTE — PROGRESS NOTES
SW met with patient who confirmed demographic information, states that he lives with his boyfriend Bello Carvalho. Patient is seen at [facility] for primary care and is current. He does not use assistive devices to ambulate, does not require ADL assistance, and denies any falls. Patient feels at baseline in terms of strengths. SW continuing to follow. Patient anticipated to admit. Anticipate home with partner.      Kenya Thomas LMSW    [facility] CutlerCandySt Luke Medical Center    * [email or web address]

## 2021-05-06 NOTE — ED PROVIDER NOTES
Patient is a 72-year-old male presenting to the emergency department today complaining of left lower quadrant abdominal pain. This is the patient's third ER visit this week and he states the reason he came back today was because he was called for positive blood cultures. He was seen and evaluated in the emergency department yesterday with CT findings as shown below. Patient also had a noted elevated white blood cell count however it was improved from his previous blood work done earlier this week. The patient states he does have a history of HIV but does not know what his CD4 count is. The patient says that he still having left lower quadrant abdominal pain and nausea and vomiting. He did take Tylenol and his morning dose of antibiotics prior to coming back to the emergency department today. CT ABD/Pelvis 5/5/21  IMPRESSION  1. No obstructing renal stone. Nephrolithiasis as above. 2. Extensive wall thickening and inflammation of the distal colon to level the  rectum without abscess or free air. Inflammatory bowel disease/colitis favored. Increased number small retroperitoneal nodes likely reactive. Past Medical History:   Diagnosis Date    [facility]        No past surgical history on file. No family history on file.     Social History     Socioeconomic History    Marital status: SINGLE     Spouse name: Not on file    Number of children: Not on file    Years of education: Not on file    Highest education level: Not on file   Occupational History    Not on file   Social Needs    Financial resource strain: Not on file    Food insecurity     Worry: Not on file     Inability: Not on file    Transportation needs     Medical: Not on file     Non-medical: Not on file   Tobacco Use    Smoking status: Current Every Day Smoker     Packs/day: 0.25    Smokeless tobacco: Never Used   Substance and Sexual Activity    Alcohol use: Yes     Comment: 3-4 a week    Drug use: Yes     Types: Marijuana    Sexual activity: Yes     Partners: Female   Lifestyle    Physical activity     Days per week: Not on file     Minutes per session: Not on file    Stress: Not on file   Relationships    Social connections     Talks on phone: Not on file     Gets together: Not on file     Attends [employer] service: Not on file     Active member of club or organization: Not on file     Attends meetings of clubs or organizations: Not on file     Relationship status: Not on file    Intimate partner violence     Fear of current or ex partner: Not on file     Emotionally abused: Not on file     Physically abused: Not on file     Forced sexual activity: Not on file   Other Topics Concern    Not on file   Social History Narrative    Not on file         ALLERGIES: Patient has no known allergies. Review of Systems   Constitutional: Positive for chills and fever. Gastrointestinal: Positive for abdominal pain, diarrhea and nausea. All other systems reviewed and are negative. Vitals:    05/06/21 1207   BP: 128/78   Pulse: (!) 116   Resp: 18   Temp: 99.2 °F (37.3 °C)   SpO2: 97%   Weight: 113.4 kg (250 lb)   Height: 5' 7\" (1.702 m)            Physical Exam     GENERAL:The patient has Body mass index is 39.16 kg/m². Well-hydrated. VITAL SIGNS: Heart rate, blood pressure, respiratory rate reviewed as recorded in  nurse's notes  EYES: Pupils reactive. Extraocular motion intact. No conjunctival redness or drainage. NECK: Supple, no meningeal signs. Trachea midline. No masses or thyromegaly. LUNGS: Breath sounds clear and equal bilaterally no accessory muscle use  CHEST: No deformity  CARDIOVASCULAR: Regular rate and rhythm  ABDOMEN: Soft with LLQ tenderness. No palpable masses or organomegaly. No  peritoneal signs. No rigidity. EXTREMITIES: No clubbing or cyanosis. No joint swelling. Normal muscle tone. No  restricted range of motion appreciated.   NEUROLOGIC: Sensation is grossly intact. Cranial nerve exam reveals face is  symmetrical, tongue is midline speech is clear. SKIN: No rash or petechiae. Good skin turgor palpated. PSYCHIATRIC: Alert and oriented. Appropriate behavior and judgment. MDM  Number of Diagnoses or Management Options  Diagnosis management comments: Viral infection, gastroenteritis, viral adenitis, pseudomembranous colitis, inflammatory  bowel disease, infectious diarrhea    Abdominal wall pain,     Constipation, fecal impaction, small bowel obstruction, partial small bowel obstruction,  Ileus    UTI, pyelonephritis, renal colic, ureteral stone     Peptic ulcer disease, esophagitis, GERD    Pancreatitis, pancreatic pseudocyst,    hepatic cirrhosis, GI bleed, esophageal varices, poisoning,    gallbladder disease, cholecystitis, diverticulitis, appendicitis, appendicitis with rupture,    ingestion of foreign material         Amount and/or Complexity of Data Reviewed  Clinical lab tests: ordered and reviewed  Tests in the radiology section of CPT®: ordered and reviewed  Tests in the medicine section of CPT®: reviewed and ordered  Review and summarize past medical records: yes  Discuss the patient with other providers: yes  Independent visualization of images, tracings, or specimens: yes      ED Course as of May 06 1352   Thu May 06, 2021   1332 I talked to the patient by the findings in the emergency department on his blood work yesterday as well as his CT imaging studies. I talked to the patient about the need for admission and IV antibiotics and he is agreeable with this plan. He will be given IV Rocephin here in the emergency department and the by to ED hospitalist on-call will be updated regarding the patient.     [KH]      ED Course User Index  [KH] Jeannette Durham, DO       Procedures

## 2021-05-06 NOTE — PROGRESS NOTES
SW reviewed patient's chart and conducted a baseline assessment. Discharge plan at this time is as follows:     Care Management Interventions  PCP Verified by CM: Yes  Mode of Transport at Discharge: Self  Transition of Care Consult (CM Consult): Discharge Planning  Current Support Network: Own Home  Confirm Follow Up Transport: Family  Discharge Location  Discharge Placement: Home with family assistance      *Please note that discharge plans can change throughout an inpatient admission.  Ensure that you are referring to the most recent social work/nurse case management note for current discharge plan*     Magalys Matthew, [address] [place] [address] Work   [facility]    * [email or web address]

## 2021-05-06 NOTE — PROGRESS NOTES
Critical called from Dago Cook in Lab for positive blood culture. I have spoken with the patient via phone, after he verified identification, and instructed him to return to the ER for treatment. Patient states he will return today.

## 2021-05-06 NOTE — H&P
[facility] Hospitalist History and Physical       Name:  Dayron Salas  Age:40 y.o. Sex:male   :  1980    MRN:  [id number]   PCP:  Jesús Cadena MD      Admit Date:  2021 12:12 PM   Chief Complaint: ABDOMINAL PAIN, DIARRHEA, POSITIVE BLOOD CULTURE    Reason for Admission:   Colitis [K52.9]  Bacteremia [R78.81]    Assessment & Plan:     Acute colitis- ct -inflammation of the distal colon to level the rectum. Cont zosyn. cont eber medications, antiemetic and full liquid diet. Bacteremia- one of the blood culture - gram positive rods. Diarrhea- c.diff negative. Hypokalemia- from labs done 2021- pending labs today  HIV - cont home medications. Says f/u with [facility]. Disposition/Expected LOS: 2-3 days  Diet: DIET FULL LIQUID  VTE ppx: lovenox  GI ppx: famotidine  Code status: Full Code  Surrogate decision-maker:       History of Presenting Illness:     Dayron Salas is a 47 y.o. male with medical history of HIV with regular follow up. Pt symptoms started about a week ago, since when he started having, nausea, vomiting, diarrhea and left lower abdominal pain. He was seen in ER twice prior to today. He was sent yesterday on antibiotics , k supplement and pain medications. He was called about the positive blood cultures and advised to come to ER. Apart pt c/o nausea,vomiting atleast 2-3 times/day and left lower abdominal cramapy pain- moderate, diarrhea -small amount upto 10 times/day and frequent feeling of straining-- pts other review of systems negative and noncontributory. Labs pending      Review of Systems:  A 14 point review of systems was taken and pertinent positive as per HPI. Past Medical History:   Diagnosis Date    HIV (human immunodeficiency virus infection) ([facility]        past surgical history - nil.     Family History : reviewed   family history - htn  Social History     Tobacco Use    Smoking status: Current Every Day Smoker     Packs/day: 0.25  Smokeless tobacco: Never Used   Substance Use Topics    Alcohol use: Yes     Comment: 3-4 a week       No Known Allergies      There is no immunization history on file for this patient. PTA Medications:  Current Outpatient Medications   Medication Instructions    vyvkhkbbj-cntasoff-itojtip ala (BIKTARVY) -25 mg tab 1 Tab, Oral, DAILY    cefdinir (OMNICEF) 300 mg, Oral, 2 TIMES DAILY    hyoscyamine SL (LEVSIN/SL) 0.125 mg, SubLINGual, EVERY 4 HOURS AS NEEDED    metroNIDAZOLE (FLAGYL) 500 mg, Oral, 2 TIMES DAILY    ondansetron (ZOFRAN ODT) 8 mg, Oral, EVERY 8 HOURS AS NEEDED    ondansetron hcl (ZOFRAN) 4 mg, Oral, EVERY 8 HOURS AS NEEDED       Objective:     Patient Vitals for the past 24 hrs:   Temp Pulse Resp BP SpO2   05/06/21 1207 99.2 °F (37.3 °C) (!) 116 18 128/78 97 %       Oxygen Therapy  O2 Sat (%): 97 % (05/06/21 1207)  O2 Device: None (Room air) (05/06/21 1207)    Body mass index is 39.16 kg/m². Physical Exam:    General:  No acute distress, speaking in full sentences  HEENT:  Pupils equal and reactive to light and accommodation, oropharynx is clear   Neck:   Supple, no lymphadenopathy, no JVD   Lungs:  Clear to auscultation bilaterally   CV:   Regular rate and rhythm with normal S1 and S2   Abdomen:  Mild left lower quadrant tenderness, no rebound, mild guarding  Extremities:  No cyanosis clubbing or edema   Neuro:  Nonfocal, A&O x3   Psych:  Normal mood and affect       Data Reviewed: I have reviewed all labs, meds, and studies. No results found for this or any previous visit (from the past 24 hour(s)). EKG Results     None          All Micro Results     None          Other Studies:  No results found.       Medications:  Medications Administered      Medications Administered     cefTRIAXone (ROCEPHIN) 1 g in 0.9% sodium chloride (MBP/ADV) 50 mL MBP     Admin Date  05/06/2021 Action  New Bag Dose  1 g Rate  100 mL/hr Route  IntraVENous Administered By  Ian Cordero RN ketorolac (TORADOL) injection 15 mg     Admin Date  05/06/2021 Action  Given Dose  15 mg Route  IntraVENous Administered By  Erick France RN          ondansetron [facility]) injection 4 mg     Admin Date  05/06/2021 Action  Given Dose  4 mg Route  IntraVENous Administered By  Erick France RN          sodium chloride 0.9 % bolus infusion 1,000 mL     Admin Date  05/06/2021 Action  New Bag Dose  1,000 mL Rate  1,000 mL/hr Route  IntraVENous Administered By  Erick France RN                      Problem List:     Hospital Problems as of 5/6/2021 Never Reviewed          Codes Class Noted - Resolved POA    Bacteremia ICD-10-CM: R78.81  ICD-9-CM: 790.7  5/6/2021 - Present Unknown        Colitis ICD-10-CM: K52.9  ICD-9-CM: 558.9  5/6/2021 - Present Unknown        [facility] ICD-10-CM: B20  ICD-9-CM: V08  5/6/2021 - Present Unknown                 Signed By: Ines Chaudhari MD   [facility] Hospitalist Service    May 6, 2021

## 2021-05-07 LAB
ANION GAP SERPL CALC-SCNC: 4 MMOL/L (ref 7–16)
BASOPHILS # BLD: 0.1 K/UL (ref 0–0.2)
BASOPHILS NFR BLD: 1 % (ref 0–2)
BUN SERPL-MCNC: 3 MG/DL (ref 6–23)
CALCIUM SERPL-MCNC: 7.7 MG/DL (ref 8.3–10.4)
CHLORIDE SERPL-SCNC: 105 MMOL/L (ref 98–107)
CO2 SERPL-SCNC: 32 MMOL/L (ref 21–32)
CREAT SERPL-MCNC: 0.93 MG/DL (ref 0.8–1.5)
DIFFERENTIAL METHOD BLD: ABNORMAL
EOSINOPHIL # BLD: 0.2 K/UL (ref 0–0.8)
EOSINOPHIL NFR BLD: 2 % (ref 0.5–7.8)
ERYTHROCYTE [DISTWIDTH] IN BLOOD BY AUTOMATED COUNT: 12.3 % (ref 11.9–14.6)
GLUCOSE SERPL-MCNC: 99 MG/DL (ref 65–100)
HCT VFR BLD AUTO: 37.4 % (ref 41.1–50.3)
HGB BLD-MCNC: 12.1 G/DL (ref 13.6–17.2)
IMM GRANULOCYTES # BLD AUTO: 0.2 K/UL (ref 0–0.5)
IMM GRANULOCYTES NFR BLD AUTO: 2 % (ref 0–5)
LYMPHOCYTES # BLD: 2.3 K/UL (ref 0.5–4.6)
LYMPHOCYTES NFR BLD: 21 % (ref 13–44)
MCH RBC QN AUTO: 29.7 PG (ref 26.1–32.9)
MCHC RBC AUTO-ENTMCNC: 32.4 G/DL (ref 31.4–35)
MCV RBC AUTO: 91.7 FL (ref 79.6–97.8)
MONOCYTES # BLD: 1.3 K/UL (ref 0.1–1.3)
MONOCYTES NFR BLD: 12 % (ref 4–12)
NEUTS SEG # BLD: 6.8 K/UL (ref 1.7–8.2)
NEUTS SEG NFR BLD: 63 % (ref 43–78)
NRBC # BLD: 0 K/UL (ref 0–0.2)
PLATELET # BLD AUTO: 234 K/UL (ref 150–450)
PMV BLD AUTO: 10.5 FL (ref 9.4–12.3)
POTASSIUM SERPL-SCNC: 2.9 MMOL/L (ref 3.5–5.1)
RBC # BLD AUTO: 4.08 M/UL (ref 4.23–5.6)
SODIUM SERPL-SCNC: 141 MMOL/L (ref 136–145)
WBC # BLD AUTO: 10.8 K/UL (ref 4.3–11.1)

## 2021-05-07 PROCEDURE — 85025 COMPLETE CBC W/AUTO DIFF WBC: CPT

## 2021-05-07 PROCEDURE — 74011250637 HC RX REV CODE- 250/637: Performed by: FAMILY MEDICINE

## 2021-05-07 PROCEDURE — 36415 COLL VENOUS BLD VENIPUNCTURE: CPT

## 2021-05-07 PROCEDURE — 65660000000 HC RM CCU STEPDOWN

## 2021-05-07 PROCEDURE — 74011000258 HC RX REV CODE- 258: Performed by: FAMILY MEDICINE

## 2021-05-07 PROCEDURE — 80048 BASIC METABOLIC PNL TOTAL CA: CPT

## 2021-05-07 PROCEDURE — 74011250636 HC RX REV CODE- 250/636: Performed by: FAMILY MEDICINE

## 2021-05-07 RX ORDER — POTASSIUM CHLORIDE 20 MEQ/1
40 TABLET, EXTENDED RELEASE ORAL EVERY 4 HOURS
Status: COMPLETED | OUTPATIENT
Start: 2021-05-07 | End: 2021-05-07

## 2021-05-07 RX ADMIN — MAGNESIUM GLUCONATE 500 MG ORAL TABLET 400 MG: 500 TABLET ORAL at 09:03

## 2021-05-07 RX ADMIN — PROMETHAZINE HYDROCHLORIDE 12.5 MG: 25 TABLET ORAL at 22:02

## 2021-05-07 RX ADMIN — MAGNESIUM GLUCONATE 500 MG ORAL TABLET 400 MG: 500 TABLET ORAL at 18:24

## 2021-05-07 RX ADMIN — Medication 10 ML: at 15:00

## 2021-05-07 RX ADMIN — PIPERACILLIN SODIUM AND TAZOBACTAM SODIUM 4.5 G: 4; .5 INJECTION, POWDER, LYOPHILIZED, FOR SOLUTION INTRAVENOUS at 18:23

## 2021-05-07 RX ADMIN — POTASSIUM CHLORIDE 40 MEQ: 1500 TABLET, EXTENDED RELEASE ORAL at 11:42

## 2021-05-07 RX ADMIN — MORPHINE SULFATE 1 MG: 2 INJECTION, SOLUTION INTRAMUSCULAR; INTRAVENOUS at 00:20

## 2021-05-07 RX ADMIN — POTASSIUM CHLORIDE 40 MEQ: 1500 TABLET, EXTENDED RELEASE ORAL at 09:03

## 2021-05-07 RX ADMIN — ENOXAPARIN SODIUM 40 MG: 40 INJECTION SUBCUTANEOUS at 09:00

## 2021-05-07 RX ADMIN — MORPHINE SULFATE 1 MG: 2 INJECTION, SOLUTION INTRAMUSCULAR; INTRAVENOUS at 09:15

## 2021-05-07 RX ADMIN — FAMOTIDINE 20 MG: 20 TABLET ORAL at 09:03

## 2021-05-07 RX ADMIN — ZOLPIDEM TARTRATE 5 MG: 5 TABLET ORAL at 00:26

## 2021-05-07 RX ADMIN — PIPERACILLIN SODIUM AND TAZOBACTAM SODIUM 4.5 G: 4; .5 INJECTION, POWDER, LYOPHILIZED, FOR SOLUTION INTRAVENOUS at 09:03

## 2021-05-07 RX ADMIN — FAMOTIDINE 20 MG: 20 TABLET ORAL at 18:24

## 2021-05-07 RX ADMIN — PIPERACILLIN SODIUM AND TAZOBACTAM SODIUM 4.5 G: 4; .5 INJECTION, POWDER, LYOPHILIZED, FOR SOLUTION INTRAVENOUS at 00:26

## 2021-05-07 RX ADMIN — Medication 10 ML: at 21:02

## 2021-05-07 RX ADMIN — POTASSIUM CHLORIDE 40 MEQ: 1500 TABLET, EXTENDED RELEASE ORAL at 00:26

## 2021-05-07 NOTE — PROGRESS NOTES
END OF SHIFT NOTE:    Intake/Output  05/07 0701 - 05/07 1900  In: 840 [P.O.:840]  Out: -    Voiding: YES  Catheter: NO  Drain:      Stool:  0 occurrences. Stool Assessment  Stool Appearance: Loose (05/06/21 2330)    Emesis:  0 occurrences. VITAL SIGNS  Patient Vitals for the past 12 hrs:   Temp Pulse Resp BP SpO2   05/07/21 1600  92      05/07/21 1526 98.6 °F (37 °C) 92 20 124/78 99 %   05/07/21 1143 98.6 °F (37 °C) 86 20 127/73 98 %   05/07/21 0800  62      05/07/21 0733 98.3 °F (36.8 °C) 82 18 (!) 106/59 99 %       Pain Assessment  Pain 1  Pain Scale 1: Visual (05/07/21 0945)  Pain Intensity 1: 0 (05/07/21 0945)  Patient Stated Pain Goal: 0 (05/07/21 0945)  Pain Reassessment 1: Patient resting w/respiratory rate greater than 10 (05/07/21 0945)  Pain Location 1: Abdomen;Groin (05/07/21 0915)  Pain Orientation 1: Anterior (05/07/21 0915)  Pain Description 1: Aching;Constant (05/07/21 0915)  Pain Intervention(s) 1: Medication (see MAR) (05/07/21 0915)    Ambulating  Yes    Additional Information:       Shift report given to oncoming nurse Aris Carrillo RN at the bedside.     [facility]

## 2021-05-07 NOTE — ED NOTES
TRANSFER - OUT REPORT:    Verbal report given to AMITA Galeana on [employer]  being transferred to [facility] [address] [facility] for routine progression of care       Report consisted of patients Situation, Background, Assessment and   Recommendations(SBAR). Information from the following report(s) SBAR was reviewed with the receiving nurse. Lines:   Peripheral IV 99/06/16 Right Basilic (Active)        Opportunity for questions and clarification was provided.       Patient transported with:   Registered Nurse

## 2021-05-07 NOTE — PROGRESS NOTES
Care Management Interventions  PCP Verified by CM: Yes  Mode of Transport at Discharge: Self  Transition of Care Consult (CM Consult): Discharge Planning  Current Support Network: Own Home  Confirm Follow Up Transport: Family  Discharge Location  Discharge Placement: Home with family assistance  Saw pt in interdisciplinarily rounds with the following disciplines: Physician, Case Management, Nursing, Dietary,Therapy and Pharmacy. The plan of care was discussed along with goals for discharge date/ location. Per MD patient will not discharge until Monday or Tuesday pending culture results.

## 2021-05-07 NOTE — PROGRESS NOTES
05/06/21 2330   Dual Skin Pressure Injury Assessment   Dual Skin Pressure Injury Assessment WDL   Second Care Provider (Based on [address] [place] [address]) Tosha Escobedo RN. Skin Integumentary   Skin Integumentary (WDL) WDL    Pressure  Injury Documentation No Pressure Injury Noted-Pressure Ulcer Prevention Initiated   Skin Color Appropriate for ethnicity   Skin Condition/Temp Dry; Warm   Skin Integrity Tattoos (comment); Intact   Turgor Non-tenting   Hair Growth Present   Nails WDL   Varicosities Absent

## 2021-05-07 NOTE — PROGRESS NOTES
[facility] Hospitalist Progress Note     Name:  Kristen Turner  Age:40 y.o. Sex:male   :  1980    MRN:  [id number]     Admit Date:  2021    Reason for Admission:  Colitis [K52.9]  Bacteremia [R78.81]    Hospital Course/Interval history:     Kristen Turner is a 47 y.o. male with medical history of HIV with regular follow up. He was called about the positive blood cultures and advised to come to ER. Admitted for hypokalemia and bacteremia. Subjective (21):    2021  Says her left lower quadrant abdominal pain better, no diarrhea  Wants to eat real food    Review of Systems: 14 point review of systems is otherwise negative with the exception of the elements mentioned above. Assessment & Plan   Acute colitis- ct -inflammation of the distal colon to level the rectum. Cont zosyn. cont eber medications, antiemetic. 2021-increase diet from full liquid to GI soft, continue antibiotics. Bacteremia- one of the blood culture - gram positive rods. Diarrhea- c.diff negative. Hypokalemia-replace . HIV - cont home medications. Says f/u with [facility].         Diet:  DIET GI SOFT  DVT PPx: Lovenox  Code status: Full Code  Disposition/Expected LOS: 3 to 4 days            Objective:     Patient Vitals for the past 24 hrs:   Temp Pulse Resp BP SpO2   21 1600  92      21 1526 98.6 °F (37 °C) 92 20 124/78 99 %   21 1143 98.6 °F (37 °C) 86 20 127/73 98 %   21 0800  62      21 0733 98.3 °F (36.8 °C) 82 18 (!) 106/59 99 %   21 0223 99 °F (37.2 °C) 89 17  99 %   21 2326 99.9 °F (37.7 °C) 95 18 129/79 100 %   21 2311    139/75    21 1914 98.7 °F (37.1 °C) 96 16 138/82 99 %   21 1800    138/74 100 %   21 1730    126/78 97 %   21 1700    129/75 98 %     Oxygen Therapy  O2 Sat (%): 99 % (21 1526)  Pulse via Oximetry: 96 beats per minute (21 1914)  O2 Device: None (Room air) (21 1914)    Body mass index is 41.4 kg/m². Physical Exam:   General:  No acute distress, speaking in full sentences, no use of accessory muscles   HEENT-pupils equal normal reactive to light, neck supple throat normal  Lungs:  Clear to auscultation bilaterally   CV:  Regular rate and rhythm with normal S1 and S2   Abdomen:  Marked improved the left lower quadrant tenderness, no rebound no significant guarding, normal bowel sounds  Extremities:  No cyanosis clubbing or edema   Neuro:  Nonfocal, A&O x3   Psych:  Normal affect     Data Review:  I have reviewed all labs, meds, and studies from the last 24 hours:    Labs:    Recent Results (from the past 24 hour(s))   METABOLIC PANEL, BASIC    Collection Time: 05/07/21  5:41 AM   Result Value Ref Range    Sodium 141 136 - 145 mmol/L    Potassium 2.9 (L) 3.5 - 5.1 mmol/L    Chloride 105 98 - 107 mmol/L    CO2 32 21 - 32 mmol/L    Anion gap 4 (L) 7 - 16 mmol/L    Glucose 99 65 - 100 mg/dL    BUN 3 (L) 6 - 23 MG/DL    Creatinine 0.93 0.8 - 1.5 MG/DL    GFR est AA >60 >60 ml/min/1.73m2    GFR est non-AA >60 >60 ml/min/1.73m2    Calcium 7.7 (L) 8.3 - 10.4 MG/DL   CBC WITH AUTOMATED DIFF    Collection Time: 05/07/21  5:41 AM   Result Value Ref Range    WBC 10.8 4.3 - 11.1 K/uL    RBC 4.08 (L) 4.23 - 5.6 M/uL    HGB 12.1 (L) 13.6 - 17.2 g/dL    HCT 37.4 (L) 41.1 - 50.3 %    MCV 91.7 79.6 - 97.8 FL    MCH 29.7 26.1 - 32.9 PG    MCHC 32.4 31.4 - 35.0 g/dL    RDW 12.3 11.9 - 14.6 %    PLATELET 220 601 - 087 K/uL    MPV 10.5 9.4 - 12.3 FL    ABSOLUTE NRBC 0.00 0.0 - 0.2 K/uL    DF AUTOMATED      NEUTROPHILS 63 43 - 78 %    LYMPHOCYTES 21 13 - 44 %    MONOCYTES 12 4.0 - 12.0 %    EOSINOPHILS 2 0.5 - 7.8 %    BASOPHILS 1 0.0 - 2.0 %    IMMATURE GRANULOCYTES 2 0.0 - 5.0 %    ABS. NEUTROPHILS 6.8 1.7 - 8.2 K/UL    ABS. LYMPHOCYTES 2.3 0.5 - 4.6 K/UL    ABS. MONOCYTES 1.3 0.1 - 1.3 K/UL    ABS. EOSINOPHILS 0.2 0.0 - 0.8 K/UL    ABS. BASOPHILS 0.1 0.0 - 0.2 K/UL    ABS. IMM.  GRANS. 0.2 0.0 - 0.5 K/UL       All Micro Results     None          EKG Results     None          Other Studies:  No results found.     Current Meds:   Current Facility-Administered Medications   Medication Dose Route Frequency    nzfvvsfbb-pqenrvhn-tjobpqd ala (BIKTARVY) -25 mg tablet 1 Tab   (Patient Supplied)  1 Tab Oral DAILY    sodium chloride (NS) flush 5-40 mL  5-40 mL IntraVENous Q8H    sodium chloride (NS) flush 5-40 mL  5-40 mL IntraVENous PRN    acetaminophen (TYLENOL) tablet 650 mg  650 mg Oral Q6H PRN    Or    acetaminophen (TYLENOL) suppository 650 mg  650 mg Rectal Q6H PRN    polyethylene glycol (MIRALAX) packet 17 g  17 g Oral DAILY PRN    promethazine (PHENERGAN) tablet 12.5 mg  12.5 mg Oral Q6H PRN    Or    ondansetron (ZOFRAN) injection 4 mg  4 mg IntraVENous Q6H PRN    enoxaparin (LOVENOX) injection 40 mg  40 mg SubCUTAneous DAILY    famotidine (PEPCID) tablet 20 mg  20 mg Oral BID    HYDROcodone-acetaminophen (NORCO) 5-325 mg per tablet 1 Tab  1 Tab Oral Q4H PRN    morphine injection 1 mg  1 mg IntraVENous Q4H PRN    piperacillin-tazobactam (ZOSYN) 4.5 g in 0.9% sodium chloride (MBP/ADV) 100 mL MBP  4.5 g IntraVENous Q8H    0.9% sodium chloride with KCl 40 mEq/L infusion   IntraVENous CONTINUOUS    magnesium oxide (MAG-OX) tablet 400 mg  400 mg Oral BID       Problem List:  Hospital Problems as of 5/7/2021 Never Reviewed          Codes Class Noted - Resolved POA    Bacteremia ICD-10-CM: R78.81  ICD-9-CM: 790.7  5/6/2021 - Present Unknown        Colitis ICD-10-CM: K52.9  ICD-9-CM: 558.9  5/6/2021 - Present Unknown        HIV (human immunodeficiency virus infection) ([facility] ICD-10-CM: B20  ICD-9-CM: V08  5/6/2021 - Present Unknown        Hypokalemia ICD-10-CM: E87.6  ICD-9-CM: 276.8  5/6/2021 - Present Unknown                   Signed By: Lauri Chiu MD   [facility]ist [facility]    May 7, 2021

## 2021-05-07 NOTE — PROGRESS NOTES
Problem: Falls - Risk of  Goal: *Absence of Falls  Description: Document Anisha Too Fall Risk and appropriate interventions in the flowsheet.   Outcome: Progressing Towards Goal  Note: Fall Risk Interventions:  Call light within reach, side rails x2

## 2021-05-08 LAB
ANION GAP SERPL CALC-SCNC: 3 MMOL/L (ref 7–16)
BUN SERPL-MCNC: 3 MG/DL (ref 6–23)
CALCIUM SERPL-MCNC: 8 MG/DL (ref 8.3–10.4)
CHLORIDE SERPL-SCNC: 109 MMOL/L (ref 98–107)
CO2 SERPL-SCNC: 31 MMOL/L (ref 21–32)
CREAT SERPL-MCNC: 0.84 MG/DL (ref 0.8–1.5)
GLUCOSE SERPL-MCNC: 104 MG/DL (ref 65–100)
INR PPP: 1
POTASSIUM SERPL-SCNC: 3.4 MMOL/L (ref 3.5–5.1)
PROTHROMBIN TIME: 14 SEC (ref 12.5–14.7)
SODIUM SERPL-SCNC: 143 MMOL/L (ref 136–145)

## 2021-05-08 PROCEDURE — C1751 CATH, INF, PER/CENT/MIDLINE: HCPCS

## 2021-05-08 PROCEDURE — 65660000000 HC RM CCU STEPDOWN

## 2021-05-08 PROCEDURE — 76937 US GUIDE VASCULAR ACCESS: CPT

## 2021-05-08 PROCEDURE — 36415 COLL VENOUS BLD VENIPUNCTURE: CPT

## 2021-05-08 PROCEDURE — 74011000258 HC RX REV CODE- 258: Performed by: FAMILY MEDICINE

## 2021-05-08 PROCEDURE — 74011250636 HC RX REV CODE- 250/636: Performed by: FAMILY MEDICINE

## 2021-05-08 PROCEDURE — 87040 BLOOD CULTURE FOR BACTERIA: CPT

## 2021-05-08 PROCEDURE — 85610 PROTHROMBIN TIME: CPT

## 2021-05-08 PROCEDURE — 80048 BASIC METABOLIC PNL TOTAL CA: CPT

## 2021-05-08 PROCEDURE — 2709999900 HC NON-CHARGEABLE SUPPLY

## 2021-05-08 PROCEDURE — 74011250637 HC RX REV CODE- 250/637: Performed by: FAMILY MEDICINE

## 2021-05-08 RX ORDER — ZOLPIDEM TARTRATE 5 MG/1
5 TABLET ORAL
Status: DISCONTINUED | OUTPATIENT
Start: 2021-05-08 | End: 2021-05-10 | Stop reason: HOSPADM

## 2021-05-08 RX ORDER — NICOTINE 7MG/24HR
1 PATCH, TRANSDERMAL 24 HOURS TRANSDERMAL DAILY
Status: DISCONTINUED | OUTPATIENT
Start: 2021-05-09 | End: 2021-05-10 | Stop reason: HOSPADM

## 2021-05-08 RX ORDER — SODIUM CHLORIDE 9 MG/ML
75 INJECTION, SOLUTION INTRAVENOUS CONTINUOUS
Status: DISCONTINUED | OUTPATIENT
Start: 2021-05-08 | End: 2021-05-09

## 2021-05-08 RX ORDER — SODIUM CHLORIDE 0.9 % (FLUSH) 0.9 %
10 SYRINGE (ML) INJECTION EVERY 8 HOURS
Status: DISCONTINUED | OUTPATIENT
Start: 2021-05-08 | End: 2021-05-10 | Stop reason: HOSPADM

## 2021-05-08 RX ORDER — POTASSIUM CHLORIDE 20 MEQ/1
40 TABLET, EXTENDED RELEASE ORAL EVERY 4 HOURS
Status: COMPLETED | OUTPATIENT
Start: 2021-05-08 | End: 2021-05-08

## 2021-05-08 RX ORDER — SODIUM CHLORIDE 0.9 % (FLUSH) 0.9 %
10 SYRINGE (ML) INJECTION AS NEEDED
Status: DISCONTINUED | OUTPATIENT
Start: 2021-05-08 | End: 2021-05-10 | Stop reason: HOSPADM

## 2021-05-08 RX ADMIN — Medication 10 ML: at 21:15

## 2021-05-08 RX ADMIN — POTASSIUM CHLORIDE 40 MEQ: 1500 TABLET, EXTENDED RELEASE ORAL at 11:49

## 2021-05-08 RX ADMIN — SODIUM CHLORIDE 75 ML/HR: 9 INJECTION, SOLUTION INTRAVENOUS at 16:00

## 2021-05-08 RX ADMIN — ZOLPIDEM TARTRATE 5 MG: 5 TABLET ORAL at 20:31

## 2021-05-08 RX ADMIN — PIPERACILLIN SODIUM AND TAZOBACTAM SODIUM 4.5 G: 4; .5 INJECTION, POWDER, LYOPHILIZED, FOR SOLUTION INTRAVENOUS at 17:10

## 2021-05-08 RX ADMIN — FAMOTIDINE 20 MG: 20 TABLET ORAL at 17:10

## 2021-05-08 RX ADMIN — PIPERACILLIN SODIUM AND TAZOBACTAM SODIUM 4.5 G: 4; .5 INJECTION, POWDER, LYOPHILIZED, FOR SOLUTION INTRAVENOUS at 00:56

## 2021-05-08 RX ADMIN — Medication 10 ML: at 14:01

## 2021-05-08 RX ADMIN — MAGNESIUM GLUCONATE 500 MG ORAL TABLET 400 MG: 500 TABLET ORAL at 09:01

## 2021-05-08 RX ADMIN — POTASSIUM CHLORIDE 40 MEQ: 1500 TABLET, EXTENDED RELEASE ORAL at 09:01

## 2021-05-08 RX ADMIN — MAGNESIUM GLUCONATE 500 MG ORAL TABLET 400 MG: 500 TABLET ORAL at 17:10

## 2021-05-08 RX ADMIN — POTASSIUM CHLORIDE AND SODIUM CHLORIDE: 900; 300 INJECTION, SOLUTION INTRAVENOUS at 00:56

## 2021-05-08 RX ADMIN — ENOXAPARIN SODIUM 40 MG: 40 INJECTION SUBCUTANEOUS at 09:03

## 2021-05-08 RX ADMIN — Medication 10 ML: at 17:11

## 2021-05-08 RX ADMIN — Medication 10 ML: at 05:01

## 2021-05-08 RX ADMIN — FAMOTIDINE 20 MG: 20 TABLET ORAL at 09:01

## 2021-05-08 NOTE — PROGRESS NOTES
Patient delay: patient not ready for x-ray as of 7:55. Bloodwork.
The patient was called for notification of a NEGATIVE test result for COVID-19. The following information was given to the patient:    The COVID-19 test, also known as novel coronavirus, result was negative  You probably were not infected at the time your sample was collected. However, that does not mean you will not get sick. The test result only means that you did not have COVID-19 at the time of testing. If you have no symptoms, continue to use preventive measures to protect yourself and others. If you have been sick, there are many other potential infectious causes. Contact your Primary Care Provider or Care Team for specific guidance, particularly if your symptoms worsen.   For more information visit the CDC website: DotProtection.gl
denies pain/discomfort

## 2021-05-08 NOTE — PROGRESS NOTES
[facility]ist Progress Note     Name:  Robin Jain  Age:40 y.o. Sex:male   :  1980    MRN:  [id number]     Admit Date:  2021    Reason for Admission:  Colitis [K52.9]  Bacteremia [R78.81]    Hospital Course/Interval history:     Robin Jain is a 47 y.o. male with medical history of HIV with regular follow up. He was called about the positive blood cultures and advised to come to ER. Admitted for hypokalemia and bacteremia. Subjective (21):    2021  Says her left lower quadrant abdominal pain better, no diarrhea  Wants to eat real food    2021  Says doing fine  No abdominal pain or diarrhea  Tolerating gi soft diet    Review of Systems: 14 point review of systems is otherwise negative with the exception of the elements mentioned above. Assessment & Plan   Acute colitis- ct -inflammation of the distal colon to level the rectum. Cont zosyn. cont eber medications, antiemetic. 2021-increase diet from full liquid to GI soft, continue antibiotics. 2021- repeated blood cultures. Initial blood culture sensitivity pending. Bacteremia- one of the blood culture - gram negative rods. Diarrhea- c.diff negative. Diarrhea resolved. Hypokalemia-replace . HIV - cont home medications. Says f/u with [facility].         Diet:  DIET GI SOFT  DVT PPx: Lovenox  Code status: Full Code  Disposition/Expected LOS: 3 to 4 days            Objective:     Patient Vitals for the past 24 hrs:   Temp Pulse Resp BP SpO2   21 0743 98.3 °F (36.8 °C) 84 20 (!) 146/91 100 %   21 0324 97.9 °F (36.6 °C) 81 18 116/74 100 %   21 2329 98.2 °F (36.8 °C) 84 16 (!) 141/81 98 %   21 1934 98.2 °F (36.8 °C) 91 24 (!) 141/87 99 %   21 1600  92      21 1526 98.6 °F (37 °C) 92 20 124/78 99 %   21 1143 98.6 °F (37 °C) 86 20 127/73 98 %     Oxygen Therapy  O2 Sat (%): 100 % (21 0743)  Pulse via Oximetry: 96 beats per minute (21 1914)  O2 Device: None (Room air) (05/08/21 0705)    Body mass index is 41.4 kg/m². Physical Exam:   General:  No acute distress, speaking in full sentences, no use of accessory muscles   HEENT-pupils equal normal reactive to light, neck supple throat normal  Lungs:  Clear to auscultation bilaterally   CV:  Regular rate and rhythm with normal S1 and S2   Abdomen:  Soft nontender, no organomegaly  Extremities:  No cyanosis clubbing or edema   Neuro:  Nonfocal, A&O x3   Psych:  Normal affect     Data Review:  I have reviewed all labs, meds, and studies from the last 24 hours:    Labs:    Recent Results (from the past 24 hour(s))   METABOLIC PANEL, BASIC    Collection Time: 05/08/21  4:56 AM   Result Value Ref Range    Sodium 143 136 - 145 mmol/L    Potassium 3.4 (L) 3.5 - 5.1 mmol/L    Chloride 109 (H) 98 - 107 mmol/L    CO2 31 21 - 32 mmol/L    Anion gap 3 (L) 7 - 16 mmol/L    Glucose 104 (H) 65 - 100 mg/dL    BUN 3 (L) 6 - 23 MG/DL    Creatinine 0.84 0.8 - 1.5 MG/DL    GFR est AA >60 >60 ml/min/1.73m2    GFR est non-AA >60 >60 ml/min/1.73m2    Calcium 8.0 (L) 8.3 - 10.4 MG/DL   PROTHROMBIN TIME + INR    Collection Time: 05/08/21  6:33 AM   Result Value Ref Range    Prothrombin time 14.0 12.5 - 14.7 sec    INR 1.0         All Micro Results     Procedure Component Value Units Date/Time    CULTURE, BLOOD [[id number]] Collected: 05/08/21 0832    Order Status: Completed Specimen: Blood Updated: 05/08/21 0900    CULTURE, BLOOD [[id number]] Collected: 05/08/21 0809    Order Status: Completed Specimen: Blood Updated: 05/08/21 0900          EKG Results     None          Other Studies:  No results found.     Current Meds:   Current Facility-Administered Medications   Medication Dose Route Frequency    potassium chloride (K-DUR, KLOR-CON) SR tablet 40 mEq  40 mEq Oral Q4H    0.9% sodium chloride infusion  75 mL/hr IntraVENous CONTINUOUS    xzsypklqs-uiiwojku-nhkevmi ala (BIKTARVY) -25 mg tablet 1 Tab   (Patient Supplied)  1 Tab Oral DAILY    sodium chloride (NS) flush 5-40 mL  5-40 mL IntraVENous Q8H    sodium chloride (NS) flush 5-40 mL  5-40 mL IntraVENous PRN    acetaminophen (TYLENOL) tablet 650 mg  650 mg Oral Q6H PRN    Or    acetaminophen (TYLENOL) suppository 650 mg  650 mg Rectal Q6H PRN    polyethylene glycol (MIRALAX) packet 17 g  17 g Oral DAILY PRN    promethazine (PHENERGAN) tablet 12.5 mg  12.5 mg Oral Q6H PRN    Or    ondansetron (ZOFRAN) injection 4 mg  4 mg IntraVENous Q6H PRN    enoxaparin (LOVENOX) injection 40 mg  40 mg SubCUTAneous DAILY    famotidine (PEPCID) tablet 20 mg  20 mg Oral BID    HYDROcodone-acetaminophen (NORCO) 5-325 mg per tablet 1 Tab  1 Tab Oral Q4H PRN    morphine injection 1 mg  1 mg IntraVENous Q4H PRN    piperacillin-tazobactam (ZOSYN) 4.5 g in 0.9% sodium chloride (MBP/ADV) 100 mL MBP  4.5 g IntraVENous Q8H    magnesium oxide (MAG-OX) tablet 400 mg  400 mg Oral BID       Problem List:  Hospital Problems as of 5/8/2021 Never Reviewed          Codes Class Noted - Resolved POA    Bacteremia ICD-10-CM: R78.81  ICD-9-CM: 790.7  5/6/2021 - Present Unknown        Colitis ICD-10-CM: K52.9  ICD-9-CM: 558.9  5/6/2021 - Present Unknown        [facility] ICD-10-CM: B20  ICD-9-CM: V08  5/6/2021 - Present Unknown        Hypokalemia ICD-10-CM: E87.6  ICD-9-CM: 276.8  5/6/2021 - Present Unknown                   Signed By: Ace Mckinnon MD   [employer] [facility]    May 8, 2021

## 2021-05-08 NOTE — PROGRESS NOTES
3835- BSR received from [facility]. Pt in bed talking on phone. No needs voiced at this time. 1915-  EOS: Pt up ad christopher in room, antibiotic infusing. No c/o pain, N/V/D. VSS, no needs voiced at this time. BSR given to Garcia, [place].

## 2021-05-08 NOTE — PROGRESS NOTES
Problem: Falls - Risk of  Goal: *Absence of Falls  Description: Document Lear Shaker Fall Risk and appropriate interventions in the flowsheet.   Outcome: Progressing Towards Goal  Note: Fall Risk Interventions:            Medication Interventions: Patient to call before getting OOB, Teach patient to arise slowly

## 2021-05-09 LAB
ANION GAP SERPL CALC-SCNC: 5 MMOL/L (ref 7–16)
BASOPHILS # BLD: 0.1 K/UL (ref 0–0.2)
BASOPHILS NFR BLD: 1 % (ref 0–2)
BUN SERPL-MCNC: 4 MG/DL (ref 6–23)
CALCIUM SERPL-MCNC: 8.1 MG/DL (ref 8.3–10.4)
CHLORIDE SERPL-SCNC: 108 MMOL/L (ref 98–107)
CO2 SERPL-SCNC: 29 MMOL/L (ref 21–32)
CREAT SERPL-MCNC: 0.77 MG/DL (ref 0.8–1.5)
DIFFERENTIAL METHOD BLD: ABNORMAL
EOSINOPHIL # BLD: 0.3 K/UL (ref 0–0.8)
EOSINOPHIL NFR BLD: 3 % (ref 0.5–7.8)
ERYTHROCYTE [DISTWIDTH] IN BLOOD BY AUTOMATED COUNT: 11.9 % (ref 11.9–14.6)
GLUCOSE SERPL-MCNC: 123 MG/DL (ref 65–100)
HCT VFR BLD AUTO: 36.8 % (ref 41.1–50.3)
HGB BLD-MCNC: 11.7 G/DL (ref 13.6–17.2)
IMM GRANULOCYTES # BLD AUTO: 0.2 K/UL (ref 0–0.5)
IMM GRANULOCYTES NFR BLD AUTO: 2 % (ref 0–5)
LYMPHOCYTES # BLD: 2.5 K/UL (ref 0.5–4.6)
LYMPHOCYTES NFR BLD: 28 % (ref 13–44)
MCH RBC QN AUTO: 29.6 PG (ref 26.1–32.9)
MCHC RBC AUTO-ENTMCNC: 31.8 G/DL (ref 31.4–35)
MCV RBC AUTO: 93.2 FL (ref 79.6–97.8)
MONOCYTES # BLD: 0.8 K/UL (ref 0.1–1.3)
MONOCYTES NFR BLD: 9 % (ref 4–12)
NEUTS SEG # BLD: 5.3 K/UL (ref 1.7–8.2)
NEUTS SEG NFR BLD: 58 % (ref 43–78)
NRBC # BLD: 0 K/UL (ref 0–0.2)
PLATELET # BLD AUTO: 270 K/UL (ref 150–450)
PMV BLD AUTO: 10.1 FL (ref 9.4–12.3)
POTASSIUM SERPL-SCNC: 3.7 MMOL/L (ref 3.5–5.1)
RBC # BLD AUTO: 3.95 M/UL (ref 4.23–5.6)
SODIUM SERPL-SCNC: 142 MMOL/L (ref 136–145)
WBC # BLD AUTO: 9.1 K/UL (ref 4.3–11.1)

## 2021-05-09 PROCEDURE — 74011250637 HC RX REV CODE- 250/637: Performed by: FAMILY MEDICINE

## 2021-05-09 PROCEDURE — 80048 BASIC METABOLIC PNL TOTAL CA: CPT

## 2021-05-09 PROCEDURE — 74011000258 HC RX REV CODE- 258: Performed by: FAMILY MEDICINE

## 2021-05-09 PROCEDURE — 85025 COMPLETE CBC W/AUTO DIFF WBC: CPT

## 2021-05-09 PROCEDURE — 65660000000 HC RM CCU STEPDOWN

## 2021-05-09 PROCEDURE — 74011250636 HC RX REV CODE- 250/636: Performed by: FAMILY MEDICINE

## 2021-05-09 RX ORDER — ENOXAPARIN SODIUM 100 MG/ML
40 INJECTION SUBCUTANEOUS EVERY 12 HOURS
Status: DISCONTINUED | OUTPATIENT
Start: 2021-05-09 | End: 2021-05-10 | Stop reason: HOSPADM

## 2021-05-09 RX ORDER — POTASSIUM CHLORIDE 20 MEQ/1
40 TABLET, EXTENDED RELEASE ORAL ONCE
Status: COMPLETED | OUTPATIENT
Start: 2021-05-09 | End: 2021-05-09

## 2021-05-09 RX ADMIN — ZOLPIDEM TARTRATE 5 MG: 5 TABLET ORAL at 20:54

## 2021-05-09 RX ADMIN — PIPERACILLIN SODIUM AND TAZOBACTAM SODIUM 4.5 G: 4; .5 INJECTION, POWDER, LYOPHILIZED, FOR SOLUTION INTRAVENOUS at 00:37

## 2021-05-09 RX ADMIN — FAMOTIDINE 20 MG: 20 TABLET ORAL at 17:16

## 2021-05-09 RX ADMIN — Medication 10 ML: at 21:39

## 2021-05-09 RX ADMIN — MAGNESIUM GLUCONATE 500 MG ORAL TABLET 400 MG: 500 TABLET ORAL at 17:16

## 2021-05-09 RX ADMIN — ENOXAPARIN SODIUM 40 MG: 40 INJECTION SUBCUTANEOUS at 08:38

## 2021-05-09 RX ADMIN — MAGNESIUM GLUCONATE 500 MG ORAL TABLET 400 MG: 500 TABLET ORAL at 08:38

## 2021-05-09 RX ADMIN — Medication 10 ML: at 12:21

## 2021-05-09 RX ADMIN — POTASSIUM CHLORIDE 40 MEQ: 1500 TABLET, EXTENDED RELEASE ORAL at 12:20

## 2021-05-09 RX ADMIN — PIPERACILLIN SODIUM AND TAZOBACTAM SODIUM 4.5 G: 4; .5 INJECTION, POWDER, LYOPHILIZED, FOR SOLUTION INTRAVENOUS at 17:16

## 2021-05-09 RX ADMIN — Medication 10 ML: at 05:14

## 2021-05-09 RX ADMIN — ENOXAPARIN SODIUM 40 MG: 40 INJECTION SUBCUTANEOUS at 20:54

## 2021-05-09 RX ADMIN — FAMOTIDINE 20 MG: 20 TABLET ORAL at 08:38

## 2021-05-09 RX ADMIN — PIPERACILLIN SODIUM AND TAZOBACTAM SODIUM 4.5 G: 4; .5 INJECTION, POWDER, LYOPHILIZED, FOR SOLUTION INTRAVENOUS at 08:38

## 2021-05-09 NOTE — PROGRESS NOTES
[facility]ist Progress Note     Name:  Florina Lovell  Age:40 y.o. Sex:male   :  1980    MRN:  [id number]     Admit Date:  2021    Reason for Admission:  Colitis [K52.9]  Bacteremia [R78.81]    Hospital Course/Interval history:     Florina Lovell is a 47 y.o. male with medical history of HIV with regular follow up. He was called about the positive blood cultures and advised to come to ER. Admitted for hypokalemia and bacteremia. Subjective (21):    2021  Says her left lower quadrant abdominal pain better, no diarrhea  Wants to eat real food    2021  Says doing fine  No abdominal pain or diarrhea  Tolerating gi soft diet    2021  Says doing fine  No complaints    Review of Systems: 14 point review of systems is otherwise negative with the exception of the elements mentioned above. Assessment & Plan   Acute colitis- ct -inflammation of the distal colon to level the rectum. Cont zosyn. cont eber medications, antiemetic. 2021-increase diet from full liquid to GI soft, continue antibiotics. 2021- repeated blood cultures. Initial blood culture sensitivity pending. 2021- cont antibiotics. Changed to regualr diet. Repeat blood culture no growth for now. Dc fluids  Bacteremia- one of the blood culture - gram negative rods. Diarrhea- c.diff negative. Diarrhea resolved. Hypokalemia-resolved. will dc telemetry  HIV - cont home medications. Says f/u with [facility].         Diet:  DIET GI SOFT  DVT PPx: Lovenox  Code status: Full Code  Disposition/Expected LOS: 3 to 4 days            Objective:     Patient Vitals for the past 24 hrs:   Temp Pulse Resp BP SpO2   21 0740 97.9 °F (36.6 °C) 78 18 (!) 146/82 100 %   21 0331 98.4 °F (36.9 °C) 79 18 134/69 99 %   21 2334 98.3 °F (36.8 °C) 80 20 125/68 98 %   21 1921 98.4 °F (36.9 °C) 83 22 (!) 146/73 100 %   21 1452 98.2 °F (36.8 °C) 79 20 126/85 100 %   21 1132 98.2 °F (36.8 °C) 87 20 (!) 136/97 100 %     Oxygen Therapy  O2 Sat (%): 100 % (05/09/21 0740)  Pulse via Oximetry: 96 beats per minute (05/06/21 1914)  O2 Device: None (Room air) (05/08/21 1400)    Body mass index is 41.4 kg/m². Physical Exam:   General:  No acute distress, speaking in full sentences, no use of accessory muscles   HEENT-pupils equal normal reactive to light, neck supple throat normal  Lungs:  Clear to auscultation bilaterally   CV:  Regular rate and rhythm with normal S1 and S2   Abdomen:  Soft nontender, no organomegaly  Extremities:  No cyanosis clubbing or edema   Neuro:  Nonfocal, A&O x3   Psych:  Normal affect     Data Review:  I have reviewed all labs, meds, and studies from the last 24 hours:    Labs:    Recent Results (from the past 24 hour(s))   METABOLIC PANEL, BASIC    Collection Time: 05/09/21  2:28 AM   Result Value Ref Range    Sodium 142 136 - 145 mmol/L    Potassium 3.7 3.5 - 5.1 mmol/L    Chloride 108 (H) 98 - 107 mmol/L    CO2 29 21 - 32 mmol/L    Anion gap 5 (L) 7 - 16 mmol/L    Glucose 123 (H) 65 - 100 mg/dL    BUN 4 (L) 6 - 23 MG/DL    Creatinine 0.77 (L) 0.8 - 1.5 MG/DL    GFR est AA >60 >60 ml/min/1.73m2    GFR est non-AA >60 >60 ml/min/1.73m2    Calcium 8.1 (L) 8.3 - 10.4 MG/DL   CBC WITH AUTOMATED DIFF    Collection Time: 05/09/21  2:28 AM   Result Value Ref Range    WBC 9.1 4.3 - 11.1 K/uL    RBC 3.95 (L) 4.23 - 5.6 M/uL    HGB 11.7 (L) 13.6 - 17.2 g/dL    HCT 36.8 (L) 41.1 - 50.3 %    MCV 93.2 79.6 - 97.8 FL    MCH 29.6 26.1 - 32.9 PG    MCHC 31.8 31.4 - 35.0 g/dL    RDW 11.9 11.9 - 14.6 %    PLATELET 536 227 - 712 K/uL    MPV 10.1 9.4 - 12.3 FL    ABSOLUTE NRBC 0.00 0.0 - 0.2 K/uL    DF AUTOMATED      NEUTROPHILS 58 43 - 78 %    LYMPHOCYTES 28 13 - 44 %    MONOCYTES 9 4.0 - 12.0 %    EOSINOPHILS 3 0.5 - 7.8 %    BASOPHILS 1 0.0 - 2.0 %    IMMATURE GRANULOCYTES 2 0.0 - 5.0 %    ABS. NEUTROPHILS 5.3 1.7 - 8.2 K/UL    ABS. LYMPHOCYTES 2.5 0.5 - 4.6 K/UL    ABS.  MONOCYTES 0.8 0.1 - 1.3 K/UL    ABS. EOSINOPHILS 0.3 0.0 - 0.8 K/UL    ABS. BASOPHILS 0.1 0.0 - 0.2 K/UL    ABS. IMM. GRANS. 0.2 0.0 - 0.5 K/UL       All Micro Results     Procedure Component Value Units Date/Time    CULTURE, BLOOD [[id number]] Collected: 05/08/21 0809    Order Status: Completed Specimen: Blood Updated: 05/09/21 0856     Special Requests: --        LEFT  ARM       Culture result: NO GROWTH AFTER 23 HOURS       CULTURE, BLOOD [[id number]] Collected: 05/08/21 0832    Order Status: Completed Specimen: Blood Updated: 05/09/21 0856     Special Requests: NO SPECIAL REQUESTS        Culture result: NO GROWTH AFTER 23 HOURS             EKG Results     None          Other Studies:  No results found.     Current Meds:   Current Facility-Administered Medications   Medication Dose Route Frequency    enoxaparin (LOVENOX) injection 40 mg  40 mg SubCUTAneous Q12H    sodium chloride (NS) flush 10 mL  10 mL InterCATHeter Q8H    sodium chloride (NS) flush 10 mL  10 mL InterCATHeter PRN    nicotine (NICODERM CQ) 7 mg/24 hr patch 1 Patch  1 Patch TransDERmal DAILY    zolpidem (AMBIEN) tablet 5 mg  5 mg Oral QHS PRN    ykxiqzooc-vcbsnrjr-kpfbfdh ala (BIKTARVY) -25 mg tablet 1 Tab   (Patient Supplied)  1 Tab Oral DAILY    sodium chloride (NS) flush 5-40 mL  5-40 mL IntraVENous Q8H    sodium chloride (NS) flush 5-40 mL  5-40 mL IntraVENous PRN    acetaminophen (TYLENOL) tablet 650 mg  650 mg Oral Q6H PRN    Or    acetaminophen (TYLENOL) suppository 650 mg  650 mg Rectal Q6H PRN    polyethylene glycol (MIRALAX) packet 17 g  17 g Oral DAILY PRN    promethazine (PHENERGAN) tablet 12.5 mg  12.5 mg Oral Q6H PRN    Or    ondansetron (ZOFRAN) injection 4 mg  4 mg IntraVENous Q6H PRN    famotidine (PEPCID) tablet 20 mg  20 mg Oral BID    HYDROcodone-acetaminophen (NORCO) 5-325 mg per tablet 1 Tab  1 Tab Oral Q4H PRN    morphine injection 1 mg  1 mg IntraVENous Q4H PRN    piperacillin-tazobactam (ZOSYN) 4.5 g in 0.9% sodium chloride (MBP/ADV) 100 mL MBP  4.5 g IntraVENous Q8H    magnesium oxide (MAG-OX) tablet 400 mg  400 mg Oral BID       Problem List:  Hospital Problems as of 5/9/2021 Never Reviewed          Codes Class Noted - Resolved POA    Bacteremia ICD-10-CM: R78.81  ICD-9-CM: 790.7  5/6/2021 - Present Unknown        Colitis ICD-10-CM: K52.9  ICD-9-CM: 558.9  5/6/2021 - Present Unknown        HIV (human immunodeficiency virus infection) ([facility] ICD-10-CM: B20  ICD-9-CM: V08  5/6/2021 - Present Unknown        Hypokalemia ICD-10-CM: E87.6  ICD-9-CM: 276.8  5/6/2021 - Present Unknown                   Signed By: Demond Adam MD   [address] [place], [place] [address]ist Service    May 9, 2021

## 2021-05-10 VITALS
TEMPERATURE: 98 F | OXYGEN SATURATION: 95 % | RESPIRATION RATE: 18 BRPM | SYSTOLIC BLOOD PRESSURE: 143 MMHG | HEIGHT: 67 IN | DIASTOLIC BLOOD PRESSURE: 82 MMHG | HEART RATE: 79 BPM | WEIGHT: 264.3 LBS | BODY MASS INDEX: 41.48 KG/M2

## 2021-05-10 LAB
ANION GAP SERPL CALC-SCNC: 4 MMOL/L (ref 7–16)
BACTERIA SPEC CULT: NORMAL
BUN SERPL-MCNC: 4 MG/DL (ref 6–23)
CALCIUM SERPL-MCNC: 8.4 MG/DL (ref 8.3–10.4)
CHLORIDE SERPL-SCNC: 109 MMOL/L (ref 98–107)
CO2 SERPL-SCNC: 28 MMOL/L (ref 21–32)
CREAT SERPL-MCNC: 0.8 MG/DL (ref 0.8–1.5)
GLUCOSE SERPL-MCNC: 96 MG/DL (ref 65–100)
POTASSIUM SERPL-SCNC: 4.2 MMOL/L (ref 3.5–5.1)
RPR SER QL: NONREACTIVE
SERVICE CMNT-IMP: NORMAL
SODIUM SERPL-SCNC: 141 MMOL/L (ref 136–145)

## 2021-05-10 PROCEDURE — 80048 BASIC METABOLIC PNL TOTAL CA: CPT

## 2021-05-10 PROCEDURE — 86592 SYPHILIS TEST NON-TREP QUAL: CPT

## 2021-05-10 PROCEDURE — 74011000258 HC RX REV CODE- 258: Performed by: FAMILY MEDICINE

## 2021-05-10 PROCEDURE — 74011250637 HC RX REV CODE- 250/637: Performed by: FAMILY MEDICINE

## 2021-05-10 PROCEDURE — 36415 COLL VENOUS BLD VENIPUNCTURE: CPT

## 2021-05-10 PROCEDURE — 2709999900 HC NON-CHARGEABLE SUPPLY

## 2021-05-10 PROCEDURE — 74011250636 HC RX REV CODE- 250/636: Performed by: FAMILY MEDICINE

## 2021-05-10 RX ADMIN — Medication 10 ML: at 06:20

## 2021-05-10 RX ADMIN — PIPERACILLIN SODIUM AND TAZOBACTAM SODIUM 4.5 G: 4; .5 INJECTION, POWDER, LYOPHILIZED, FOR SOLUTION INTRAVENOUS at 01:03

## 2021-05-10 RX ADMIN — ENOXAPARIN SODIUM 40 MG: 40 INJECTION SUBCUTANEOUS at 08:36

## 2021-05-10 RX ADMIN — PIPERACILLIN SODIUM AND TAZOBACTAM SODIUM 4.5 G: 4; .5 INJECTION, POWDER, LYOPHILIZED, FOR SOLUTION INTRAVENOUS at 08:37

## 2021-05-10 RX ADMIN — MAGNESIUM GLUCONATE 500 MG ORAL TABLET 400 MG: 500 TABLET ORAL at 08:37

## 2021-05-10 RX ADMIN — FAMOTIDINE 20 MG: 20 TABLET ORAL at 08:37

## 2021-05-10 NOTE — PROGRESS NOTES
5/10/2021      RE: Abner Lutz      To Whom it May Concern: This is to certify that Abner Lutz may return to work without any restriction from 5/10/2021. He has been under my care from 5/6/2021 to 5/10/2021. Please feel free to contact my office if you have any questions or concerns. Thank you for your assistance in this matter.     Sincerely,      Luis Funez MD

## 2021-05-10 NOTE — DISCHARGE INSTRUCTIONS
F/u with pcp in a week with cbc and bmp. F/u with GI in 4 weeks. Come back to ER if any new abdominal pain or fever on antibiotics. Eat foods rich in potassium. BP in 803T systolic. Mist of the times in 120s. Advised to keep a log of bp reading, atleast 3 times /day and show it to pcp at follow up. DISCHARGE SUMMARY from Nurse    PATIENT INSTRUCTIONS:    After general anesthesia or intravenous sedation, for 24 hours or while taking prescription Narcotics:  · Limit your activities  · Do not drive and operate hazardous machinery  · Do not make important personal or business decisions  · Do  not drink alcoholic beverages  · If you have not urinated within 8 hours after discharge, please contact your surgeon on call. Report the following to your surgeon:  · Excessive pain, swelling, redness or odor of or around the surgical area  · Temperature over 100.5  · Nausea and vomiting lasting longer than 4 hours or if unable to take medications  · Any signs of decreased circulation or nerve impairment to extremity: change in color, persistent  numbness, tingling, coldness or increase pain  · Any questions    What to do at Home:  Recommended activity: Activity as tolerated,     If you experience any of the following symptoms abd pain, fever >100. 5 please follow up with your pcp. *  Please give a list of your current medications to your Primary Care Provider. *  Please update this list whenever your medications are discontinued, doses are      changed, or new medications (including over-the-counter products) are added. *  Please carry medication information at all times in case of emergency situations. These are general instructions for a healthy lifestyle:    No smoking/ No tobacco products/ Avoid exposure to second hand smoke  Surgeon General's Warning:  Quitting smoking now greatly reduces serious risk to your health.     Obesity, smoking, and sedentary lifestyle greatly increases your risk for illness    A healthy diet, regular physical exercise & weight monitoring are important for maintaining a healthy lifestyle    You may be retaining fluid if you have a history of heart failure or if you experience any of the following symptoms:  Weight gain of 3 pounds or more overnight or 5 pounds in a week, increased swelling in our hands or feet or shortness of breath while lying flat in bed. Please call your doctor as soon as you notice any of these symptoms; do not wait until your next office visit. The discharge information has been reviewed with the patient. The patient verbalized understanding. Discharge medications reviewed with the patient and appropriate educational materials and side effects teaching were provided.   ___________________________________________________________________________________________________________________________________

## 2021-05-10 NOTE — DISCHARGE SUMMARY
Hospitalist Discharge Summary     Admit Date:  2021 12:12 PM   Name:  Yennifer Lau   Age:  47 y.o.  :  1980   MRN:  [id number]   PCP:  Dyaa Nath MD  Treatment Team: Attending Provider: Russell Curran MD; : Sabine Terrazas; Utilization Review: Briana Bobo RN; Utilization Review: Fernie Wright RN; Care Manager: Britta Bang RN; Consulting Provider: Russell Gibbons MD; Utilization Review: Shane Riley    Problem List for this Hospitalization:  Hospital Problems as of 5/10/2021 Never Reviewed          Codes Class Noted - Resolved POA    Bacteremia ICD-10-CM: R78.81  ICD-9-CM: 790.7  2021 - Present Unknown        Colitis ICD-10-CM: K52.9  ICD-9-CM: 558.9  2021 - Present Unknown        [facility] immunodeficiency virus [facility]) ([facility] ICD-10-CM: B20  ICD-9-CM: V08  2021 - Present Unknown        Hypokalemia ICD-10-CM: E87.6  ICD-9-CM: 276.8  2021 - Present Unknown                Admission HPI from 2021:    Yennifer Lau is a 47 y.o. male with medical history of HIV with regular follow up.     Pt symptoms started about a week ago, since when he started having, nausea, vomiting, diarrhea and left lower abdominal pain. He was seen in ER twice prior to today. He was sent yesterday on antibiotics , k supplement and pain medications. He was called about the positive blood cultures and advised to come to ER.     Apart pt c/o nausea,vomiting atleast 2-3 times/day and left lower abdominal cramapy pain- moderate, diarrhea -small amount upto 10 times/day and frequent feeling of straining-- pts other review of systems negative and noncontributory. Acute colitis- ct -inflammation of the distal colon to level the rectum. Cont zosyn. cont eber medications, antiemetic and full liquid diet. Bacteremia- one of the blood culture - gram positive rods. Diarrhea- c.diff negative.   Hypokalemia- from labs done 2021- pending labs today  HIV - cont home medications. Says f/u with [facility] Course:  Pt was started on zosyn. Blood culture grew E.coli. ID was consulted. Recommended pt finish his cefdinir and flagly. F/u with GI. Pts hypokalemia resolved. secondary to nausea/vomiting/diarrhea. Pt tolerating regular diet. BP in 916S systolic. Mist of the times in 120s. Advised to keep a log of bp reading, atleast 3 times /day and show it to pcp at follow up. Follow up instructions below. Plan was discussed with patient. All questions answered. Patient was stable at time of discharge and was instructed to call or return if there are any concerns or recurrence of symptoms. Diagnostic Imaging/Tests:   No results found. Echocardiogram results:  No results found for this visit on 05/06/21.       All Micro Results     Procedure Component Value Units Date/Time    AEROBIC SUSCEPTIBILITY ID [[id number]] Collected: 05/05/21 0804    Order Status: Completed Updated: 05/10/21 0953    CULTURE, BLOOD [[id number]] Collected: 05/08/21 0832    Order Status: Completed Specimen: Blood Updated: 05/10/21 0113     Special Requests: NO SPECIAL REQUESTS        Culture result: NO GROWTH 2 DAYS       CULTURE, BLOOD [[id number]] Collected: 05/08/21 0809    Order Status: Completed Specimen: Blood Updated: 05/10/21 0113     Special Requests: --        LEFT  ARM       Culture result: NO GROWTH 2 DAYS             Labs: Results:       BMP, Mg, Phos Recent Labs     05/10/21  0504 05/09/21  0228 05/08/21  0456    142 143   K 4.2 3.7 3.4*   * 108* 109*   CO2 28 29 31   AGAP 4* 5* 3*   BUN 4* 4* 3*   CREA 0.80 0.77* 0.84   CA 8.4 8.1* 8.0*   GLU 96 123* 104*      CBC Recent Labs     05/09/21 0228   WBC 9.1   RBC 3.95*   HGB 11.7*   HCT 36.8*      GRANS 58   LYMPH 28   EOS 3   MONOS 9   BASOS 1   IG 2   ANEU 5.3   ABL 2.5   ANITRA 0.3   ABM 0.8   ABB 0.1   AIG 0.2      LFT No results for input(s): ALT, TBIL, AP, TP, ALB, GLOB, AGRAT in the last 72 hours. No lab exists for component: SGOT, GPT   Cardiac Testing Lab Results   Component Value Date/Time    CK 87 10/01/2018 10:23 AM      Coagulation Tests Lab Results   Component Value Date/Time    Prothrombin time 14.0 05/08/2021 06:33 AM    INR 1.0 05/08/2021 06:33 AM      A1c No results found for: HBA1C, HGBE8, LZS6LRSB   Lipid Panel No results found for: CHOL, CHOLPOCT, CHOLX, CHLST, CHOLV, 674017, HDL, HDLP, LDL, LDLC, DLDLP, 526211, VLDLC, VLDL, TGLX, TRIGL, TRIGP, TGLPOCT, CHHD, CHHDX   Thyroid Panel No results found for: T4, T3U, TSH, TSHEXT     Most Recent UA No results found for: COLOR, APPRN, REFSG, SEYMOUR, PROTU, GLUCU, KETU, BILU, BLDU, UROU, ENMANUEL, LEUKU     No Known Allergies    There is no immunization history on file for this patient.     All Labs from Last 24 Hrs:  Recent Results (from the past 24 hour(s))   RPR    Collection Time: 05/10/21  2:28 AM   Result Value Ref Range    RPR NONREACTIVE NR     METABOLIC PANEL, BASIC    Collection Time: 05/10/21  5:04 AM   Result Value Ref Range    Sodium 141 136 - 145 mmol/L    Potassium 4.2 3.5 - 5.1 mmol/L    Chloride 109 (H) 98 - 107 mmol/L    CO2 28 21 - 32 mmol/L    Anion gap 4 (L) 7 - 16 mmol/L    Glucose 96 65 - 100 mg/dL    BUN 4 (L) 6 - 23 MG/DL    Creatinine 0.80 0.8 - 1.5 MG/DL    GFR est AA >60 >60 ml/min/1.73m2    GFR est non-AA >60 >60 ml/min/1.73m2    Calcium 8.4 8.3 - 10.4 MG/DL       Discharge Exam:  Patient Vitals for the past 24 hrs:   Temp Pulse Resp BP SpO2   05/10/21 0830 98.1 °F (36.7 °C) 82 18 (!) 148/94 98 %   05/10/21 0301 98.3 °F (36.8 °C) 79 18 (!) 154/76 97 %   05/09/21 2244 98.8 °F (37.1 °C) 85 18 (!) 160/89 97 %   05/09/21 1902 98.4 °F (36.9 °C) 83 18 (!) 149/94 97 %   05/09/21 1605 98.4 °F (36.9 °C) 86 18 (!) 143/97 100 %     Oxygen Therapy  O2 Sat (%): 98 % (05/10/21 0830)  Pulse via Oximetry: 96 beats per minute (05/06/21 1914)  O2 Device: None (Room air) (05/09/21 0748)    Intake/Output Summary (Last 24 hours) at 5/10/2021 1137  Last data filed at 5/10/2021 0556  Gross per 24 hour   Intake 1041.87 ml   Output 150 ml   Net 891.87 ml       General:    Well nourished. Alert. No distress. Eyes:   Normal sclera. Extraocular movements intact. ENT:  Normocephalic, atraumatic. Moist mucous membranes  CV:   Regular rate and rhythm. No murmur, rub, or gallop. Lungs:  Clear to auscultation bilaterally. No wheezing, rhonchi, or rales. Abdomen: Soft, nontender, nondistended. Bowel sounds normal.   Extremities: Warm and dry. No cyanosis or edema. Neurologic: CN II-XII grossly intact. Sensation intact. Skin:     No rashes or jaundice. Psych:  Normal mood and affect. Discharge Info:   Current Discharge Medication List      CONTINUE these medications which have NOT CHANGED    Details   metroNIDAZOLE (FlagyL) 500 mg tablet Take 1 Tab by mouth two (2) times a day for 7 days. Qty: 14 Tab, Refills: 0      cefdinir (OMNICEF) 300 mg capsule Take 1 Cap by mouth two (2) times a day. Qty: 14 Cap, Refills: 0      ondansetron hcl (ZOFRAN) 4 mg tablet Take 1 Tab by mouth every eight (8) hours as needed for Nausea or Vomiting. Qty: 15 Tab, Refills: 0      eyufsotyg-mlachmbd-vyyiagl ala (BIKTARVY) -25 mg tab Take 1 Tab by mouth daily. STOP taking these medications       hyoscyamine SL (LEVSIN/SL) 0.125 mg SL tablet Comments:   Reason for Stopping:                 Disposition: home  Activity: As tolerated. Diet: DIET REGULAR    Follow-up Appointments   Procedures    FOLLOW UP VISIT Appointment in: One Week F/u with pcp in a week with cbc and bmp. F/u with GI in 4 weeks. Come back to ER if any new abdominal pain or fever on antibiotics. Eat foods rich in potassium. F/u with pcp in a week with cbc and bmp. F/u with GI in 4 weeks. Come back to ER if any new abdominal pain or fever on antibiotics. Eat foods rich in potassium.      Standing Status:   Standing     Number of Occurrences:   1     Order Specific Question:   Appointment in     Answer: One Week         Follow-up Information     Follow up With Specialties Details Why Contact Info    Other, MD Kanu    Patient can only remember the practice name and not the physician            Time spent in patient discharge planning and coordination 25 minutes.     Signed:  Yanique Villalobos MD

## 2021-05-10 NOTE — CONSULTS
Infectious Disease Consult    Today's Date: 5/10/2021   Admit Date: 5/6/2021    Impression:   · E. Coli bacteremia (5/5/21)  · Colitis / ? Diverticulitis  · HIV - followed at [facility], on [address], most recent CD4 642/16%, VL 40 copies    Plan:   · Presentation is most consistent with diverticulitis. Pt reports no recent sexual activity. Would discharge on oral cefdinir/flagyl as previously prescribed. He can complete that course of treatment and discontinue antibiotics. · Recommend outpatient GI follow up for colonoscopy. · Continue Biktarvy. Outpatient ID follow up as previously planned. · I added on RPR just in case this is syphilitic in nature, but suspicion is very low based on his history. Anti-infectives:   · IV zosyn    Subjective:   Date of Consultation:  May 10, 2021  Referring Physician: Dr. Uli Beverly    Patient is a 47 y.o. male with well-controlled HIV on [address] who originally presented to the ER on 4/30 with diarrhea. Labs showed leukocytosis and he had fever to 103. He was presumed to have viral gastroenteritis and discharged to home with symptomatic treatment. He presented back to the ER on 5/5 with LLQ pain/flank pain and CT scan showed colitis. He was given cefdinir/flagyl and discharged. WBC had decreased to 13. On 5/6 he was called back when 5/5 BCX turned positive for E. Coli. He has been treated with IV zosyn since admission. He feels well today. ID is consulted for further recommendations. He is ready for discharge. He reports no recent sexual activity. Patient Active Problem List   Diagnosis Code    Bacteremia R78.81    Colitis K52.9    HIV (human immunodeficiency virus infection) ([facility] B20    Hypokalemia E87.6     Past Medical History:   Diagnosis Date    HIV (human immunodeficiency virus infection) ([facility]       No family history on file.    Social History     Tobacco Use    Smoking status: Current Every Day Smoker     Packs/day: 0.25    Smokeless tobacco: Never Used Substance Use Topics    Alcohol use: Yes     Comment: 3-4 a week     No past surgical history on file. Prior to Admission medications    Medication Sig Start Date End Date Taking? Authorizing Provider   metroNIDAZOLE (FlagyL) 500 mg tablet Take 1 Tab by mouth two (2) times a day for 7 days. 21 Yes Marga Braun MD   cefdinir (OMNICEF) 300 mg capsule Take 1 Cap by mouth two (2) times a day. 21  Yes Marga Braun MD   ondansetron hcl (ZOFRAN) 4 mg tablet Take 1 Tab by mouth every eight (8) hours as needed for Nausea or Vomiting. 21  Yes Marga Braun MD   hyoscyamine SL (LEVSIN/SL) 0.125 mg SL tablet 1 Tab by SubLINGual route every four (4) hours as needed for Cramping. 21  Yes Marga Braun MD   atlljmkkl-klvvbfuj-ahnnzdq ala (BIKTARVY) -25 mg tab Take 1 Tab by mouth daily. Yes Other, MD Kanu       No Known Allergies     Review of Systems:  A comprehensive review of systems was negative except for that written in the History of Present Illness. Objective:     Visit Vitals  BP (!) 148/94 (BP 1 Location: Left arm, BP Patient Position: At rest)   Pulse 82   Temp 98.1 °F (36.7 °C)   Resp 18   Ht 5' 7\" (1.702 m)   Wt 119.9 kg (264 lb 4.8 oz)   SpO2 98%   BMI 41.40 kg/m²     Temp (24hrs), Av.5 °F (36.9 °C), Min:98.1 °F (36.7 °C), Max:98.8 °F (37.1 °C)       Lines:  Peripheral IV:       Physical Exam:    General:  Alert, cooperative, well noursished, well developed, appears stated age   Eyes:  Sclera anicteric. Pupils equally round and reactive to light. Mouth/Throat: Mucous membranes normal, oral pharynx clear   Neck: Supple   Lungs:   Clear to auscultation bilaterally, good effort   CV:  Regular rate and rhythm,no murmur, click, rub or gallop   Abdomen:   Soft, non-tender - no LLQ tenderness noted.  bowel sounds normal. non-distended   Extremities: No cyanosis or edema   Skin: Skin color, texture, turgor normal. no acute rash or lesions   Lymph nodes: Cervical and supraclavicular normal   Musculoskeletal: No swelling or deformity   Lines/Devices:  Intact, no erythema, drainage or tenderness   Psych: Alert and oriented, normal mood affect given the setting       Data Review:     CBC:  Recent Labs     21   WBC 9.1   GRANS 58   MONOS 9   EOS 3   ANEU 5.3   ABL 2.5   HGB 11.7*   HCT 36.8*          BMP:  Recent Labs     05/10/21  0504 218 21  0456   CREA 0.80 0.77* 0.84   BUN 4* 4* 3*    142 143   K 4.2 3.7 3.4*   * 108* 109*   CO2 28 29 31   AGAP 4* 5* 3*   GLU 96 123* 104*       LFTS:  No results for input(s): TBILI, ALT, AP, TP, ALB in the last 72 hours. No lab exists for component: SGOT    Microbiology:     All Micro Results     Procedure Component Value Units Date/Time    CULTURE, BLOOD [[id number]] Collected: 21    Order Status: Completed Specimen: Blood Updated: 05/10/21 0113     Special Requests: NO SPECIAL REQUESTS        Culture result: NO GROWTH 2 DAYS       CULTURE, BLOOD [[id number]] Collected: 21    Order Status: Completed Specimen: Blood Updated: 05/10/21 0113     Special Requests: --        LEFT  ARM       Culture result: NO GROWTH 2 DAYS             Imagin/5/21 CT abd/pelvis: IMPRESSION  1. No obstructing renal stone. Nephrolithiasis as above. 2. Extensive wall thickening and inflammation of the distal colon to level the  rectum without abscess or free air. Inflammatory bowel disease/colitis favored. Increased number small retroperitoneal nodes likely reactive.     Signed By: Reesa Gitelman, MD     May 10, 2021

## 2021-05-10 NOTE — PROGRESS NOTES
Care Management Interventions  PCP Verified by CM: Yes  Palliative Care Criteria Met (RRAT>21 & CHF Dx)?: No  Mode of Transport at Discharge: Self  Transition of Care Consult (CM Consult): Discharge Planning  Discharge Durable Medical Equipment: No  Physical Therapy Consult: No  Occupational Therapy Consult: No  Speech Therapy Consult: No  Current Support Network: Own Home  Confirm Follow Up Transport: Self  Discharge Location  Discharge Placement: Home  Met with patient prior to d/c today. He is up in chair and alert and oriented x 3, independent of ADL's and lives with his significant other Martin Stovall. He is in agreement with d/c and  voices no concerns or needs for d/c. States he plans to drive himself home at d/c and has someone that assist him at home as needed. Patient d/c home.

## 2021-05-10 NOTE — PROGRESS NOTES
Discharge instructions reviewed with patient. Pt instructed to make follow up apts, verbalized understanding. All questions answered. Pt escorted to his car by this RN. Discharged home.

## 2021-05-10 NOTE — PROGRESS NOTES
END OF SHIFT NOTE:    Intake/Output  No intake/output data recorded. Voiding: YES  Catheter: NO  Drain:              Stool:  0 occurrences. Stool Assessment  Stool Appearance: Loose (05/06/21 2330)    Emesis:  0 occurrences. VITAL SIGNS  Patient Vitals for the past 12 hrs:   Temp Pulse Resp BP SpO2   05/09/21 2244 98.8 °F (37.1 °C) 85 18 (!) 160/89 97 %   05/09/21 1902 98.4 °F (36.9 °C) 83 18 (!) 149/94 97 %   05/09/21 1605 98.4 °F (36.9 °C) 86 18 (!) 143/97 100 %       Pain Assessment  Pain 1  Pain Scale 1: Numeric (0 - 10) (05/09/21 1920)  Pain Intensity 1: 0 (05/09/21 1920)  Patient Stated Pain Goal: 0 (05/09/21 1920)  Pain Reassessment 1: Patient resting w/respiratory rate greater than 10 (05/07/21 0945)  Pain Location 1: Abdomen;Groin (05/07/21 0915)  Pain Orientation 1: Anterior (05/07/21 0915)  Pain Description 1: Aching;Constant (05/07/21 0915)  Pain Intervention(s) 1: Medication (see MAR) (05/07/21 0915)    Ambulating  Yes    Additional Information: Prn ambien given x1. No c/o of pain. Abx given as directed. Possible d/c today or tomorrow. Pt resting quietly and stable at this time. No needs voiced. Shift report given to oncoming nurse at the bedside.     Edward Richardson RN

## 2021-05-11 NOTE — ADT AUTH CERT NOTES
Sepsis and Other Febrile Illness, without Focal Infection - Care Day 4 (5/9/2021) by Lula Landeros 
 
  
Review Status Review Entered Completed 5/10/2021 12:13  
  
Criteria Review Care Day: 4 Care Date: 5/9/2021 Level of Care: Telemetry Guideline Day 3 Level Of Care (X) Floor Clinical Status   
(X) * Mental status at baseline   
(X) * Afebrile or fever improved 5/10/2021 12:13:24 EDT by Mary Sorto   
  98. 4 Activity (X) Activity as tolerated 5/10/2021 12:13:24 EDT by Ramakrishna Lafleur Routes   
(X) * Oral hydration (X) Diet as tolerated 5/10/2021 12:13:24 EDT by Mary Sorto   
  regular diet   
(X) Parenteral or oral medications Interventions (X) WBC   
5/10/2021 12:13:24 EDT by Mary Sorto   
  5/9/2021 02:28 WBC: 9.1 RBC: 3.95 (L) HGB: 11.7 (L) HCT: 36.8 (L) Medications (X) Possible antimicrobial treatment 5/10/2021 12:13:24 EDT by Kathia Downs 4.5 g iv q 8h,  
(BIKTARVY) -25 mg tablet 1 Tab   po q d, 
k dur 40 meq po ,   
(X) Possible DVT prophylaxis 5/10/2021 12:13:24 EDT by Mary Sorto   
  SCDs 
lovenox 40 mg sc q d .   
* Milestone Additional Notes 5/9  
 98.4 °F (36.9 °C) 83 149/94- Sitting 18 97 % 5/9/2021 02:28 Chloride: 108 (H)  
CO2: 29 Anion gap: 5 (L) Glucose: 123 (H) BUN: 4 (L) Creatinine: 0.77 (L) Calcium: 8.1 (L)  
5/9/2021 02:28 WBC: 9.1 RBC: 3.95 (L) HGB: 11.7 (L) HCT: 36.8 (L)  
  
MD  
47 y. o. male with medical history of HIV with regular follow up. He was called about the positive blood cultures and advised to come to ER. Admitted for hypokalemia and bacteremia.  
    
Subjective (05/09/21):  
   
5/7/2021 Says her left lower quadrant abdominal pain better, no diarrhea Wants to eat real food  
   
5/8/2021 Says doing fine No abdominal pain or diarrhea Tolerating gi soft diet  
   
5/9/2021 Says doing fine No complaints  
   
Review of Systems: 14 point review of systems is otherwise negative with the exception of the elements mentioned above. Assessment & Plan Acute colitis- ct 5/52021-inflammation of the distal colon to level the rectum. Cont zosyn. cont eber medications, antiemetic. 5/7/2021-increase diet from full liquid to GI soft, continue antibiotics. 5/8/2021- repeated blood cultures. Initial blood culture sensitivity pending. 5/9/2021- cont antibiotics. Changed to regualr diet. Repeat blood culture no growth for now. Dc fluids Bacteremia- one of the blood culture - gram negative rods. Diarrhea- c.diff negative. Diarrhea resolved. Hypokalemia-resolved. will dc telemetry HIV - cont home medications. Says f/u with [facility].  
    
Diet:  DIET GI SOFT  
DVT PPx: Lovenox Code status: Full Code Disposition/Expected LOS: 3 to 4 days Physical Exam:   
General:          No acute distress, speaking in full sentences, no use of accessory muscles HEENT-pupils equal normal reactive to light, neck supple throat normal  
Lungs:             Clear to auscultation bilaterally CV:                  Regular rate and rhythm with normal S1 and S2 Abdomen:        Soft nontender, no organomegaly Extremities:     No cyanosis clubbing or edema Neuro:             Nonfocal, A&O x3   
Psych:             Normal affect   
  
Sepsis and Other Febrile Illness, without Focal Infection - Care Day 3 (5/8/2021) by Ariane Flynn 
 
  
Review Status Review Entered Completed 5/10/2021 12:02  
  
Criteria Review Care Day: 3 Care Date: 5/8/2021 Level of Care: Telemetry Guideline Day 3 Level Of Care (X) Floor Clinical Status   
(X) * Mental status at baseline   
(X) * Afebrile or fever improved 5/10/2021 12:02:01 EDT by Bianka Velasco   
  98. 4 Activity (X) Activity as tolerated 5/10/2021 12:02:01 EDT by Bianka Velasco   
  AMB > RESTROOM Routes ( ) * Oral hydration 5/10/2021 12:02:01 EDT by Iqra Roberson   
  ns iv at 75/h. (X) Diet as tolerated 5/10/2021 12:02:01 EDT by Iqra Roberson   
  GI SOFT diet Interventions ( ) WBC   
5/10/2021 12:02:01 EDT by Raissa Bustos not today Medications (X) Possible antimicrobial treatment 5/10/2021 12:02:01 EDT by Floboo Gigi 4.5 g iv q 8h,  
NS iv at 75/h,  lovenox 40 mg sc q d.   
biktarvy 50 200 25 mg po q d, mab ox 400 mg po bid,  
k dur 40 meq po x 2,   
(X) Possible DVT prophylaxis 5/10/2021 12:02:01 EDT by Iqra Roberson   
  lovenox 40 mg sc q d.   
* Milestone Additional Notes 5/8  
98.4 °F (36.9 °C) 83 146/73  At rest 22 100 % RA  
  
5/8/2021 04:56 Potassium: 3.4 (L) Chloride: 109 (H)  
CO2: 31 Anion gap: 3 (L) Glucose: 104 (H) BUN: 3 (L) Creatinine: 0.84 Calcium: 8.0 (L)  
  
MD  
Subjective (05/08/21):   
5/7/2021 Says her left lower quadrant abdominal pain better, no diarrhea Wants to eat real food  
   
5/8/2021 Says doing fine No abdominal pain or diarrhea Tolerating gi soft diet  
   
Review of Systems: 14 point review of systems is otherwise negative with the exception of the elements mentioned above. Assessment & Plan Acute colitis- ct 5/52021-inflammation of the distal colon to level the rectum. Cont zosyn. cont eber medications, antiemetic. 5/7/2021-increase diet from full liquid to GI soft, continue antibiotics. 5/8/2021- repeated blood cultures. Initial blood culture sensitivity pending. Bacteremia- one of the blood culture - gram negative rods. Diarrhea- c.diff negative. Diarrhea resolved. Hypokalemia-replace . HIV - cont home medications. Says f/u with [facility]. Diet:  DIET GI SOFT  
DVT PPx: Lovenox Code status: Full Code Disposition/Expected LOS: 3 to 4 days Physical Exam:   
General:          No acute distress, speaking in full sentences, no use of accessory muscles HEENT-pupils equal normal reactive to light, neck supple throat normal  
Lungs:             Clear to auscultation bilaterally CV:                  Regular rate and rhythm with normal S1 and S2 Abdomen:        Soft nontender, no organomegaly Extremities:     No cyanosis clubbing or edema Neuro:             Nonfocal, A&O x3   
Psych:             Normal affect

## 2021-05-13 LAB
ANTIMICROBIAL SUSCEPTIBILITY, 080569: ABNORMAL
ANTIMICROBIAL SUSCEPTIBILITY, 080575: ABNORMAL
BACTERIA ISLT: ABNORMAL
BACTERIA SPEC CULT: NORMAL
BACTERIA SPEC CULT: NORMAL
RESULT 1, 080565: ABNORMAL
RESULT 1, 080571: ABNORMAL
RESULT 2, 080566: ABNORMAL
RESULT 3, 080567: ABNORMAL
RESULT 4, 080568: ABNORMAL
SERVICE CMNT-IMP: NORMAL
SERVICE CMNT-IMP: NORMAL
SPECIMEN SOURCE: ABNORMAL

## 2021-05-14 LAB
BACTERIA SPEC CULT: ABNORMAL
GRAM STN SPEC: ABNORMAL
SERVICE CMNT-IMP: ABNORMAL

## 2021-12-05 ENCOUNTER — HOSPITAL ENCOUNTER (EMERGENCY)
Age: 41
Discharge: HOME | End: 2021-12-05
Attending: EMERGENCY MEDICINE
Payer: COMMERCIAL

## 2021-12-05 VITALS
HEIGHT: 67 IN | BODY MASS INDEX: 37.92 KG/M2 | OXYGEN SATURATION: 98 % | TEMPERATURE: 98.1 F | HEART RATE: 87 BPM | SYSTOLIC BLOOD PRESSURE: 150 MMHG | DIASTOLIC BLOOD PRESSURE: 96 MMHG | RESPIRATION RATE: 16 BRPM | WEIGHT: 241.6 LBS

## 2021-12-05 DIAGNOSIS — U07.1 COVID-19: Primary | ICD-10-CM

## 2021-12-05 LAB
COVID-19 RAPID TEST, COVR: DETECTED
SOURCE, COVRS: ABNORMAL

## 2021-12-05 PROCEDURE — 99283 EMERGENCY DEPT VISIT LOW MDM: CPT

## 2021-12-05 PROCEDURE — 74011000258 HC RX REV CODE- 258: Performed by: EMERGENCY MEDICINE

## 2021-12-05 PROCEDURE — M0243 CASIRIVI AND IMDEVI INFUSION: HCPCS

## 2021-12-05 PROCEDURE — 87635 SARS-COV-2 COVID-19 AMP PRB: CPT

## 2021-12-05 PROCEDURE — 74011000636 HC RX REV CODE- 636: Performed by: EMERGENCY MEDICINE

## 2021-12-05 RX ADMIN — CASIRIVIMAB AND IMDEVIMAB: 600; 600 INJECTION, SOLUTION, CONCENTRATE INTRAVENOUS at 08:52

## 2021-12-05 NOTE — ED NOTES
I have reviewed discharge instructions with the patient. The patient verbalized understanding. Patient left ED via Discharge Method: ambulatory to Home with self. Opportunity for questions and clarification provided. Patient given 0 scripts. To continue your aftercare when you leave the hospital, you may receive an automated call from our care team to check in on how you are doing. This is a free service and part of our promise to provide the best care and service to meet your aftercare needs.  If you have questions, or wish to unsubscribe from this service please call [phone]. Thank you for Choosing our [facility] Emergency Department.

## 2021-12-05 NOTE — LETTER
[id number] [address] EMERGENCY DEPT  [address] [address] [phone]    Work/School Note    Date: 12/5/2021     To Whom It May concern:    Margrette Nyhan was evaluated by the following provider(s):  Attending Provider: Mansoor Dowling [address] virus is confirmed. Per the CDC guidelines we recommend home isolation until the following conditions are all met:    1. At least 10 days have passed since symptoms first appeared and  2. At least 24 hours have passed since last fever without the use of fever-reducing medications and  3.  Symptoms (e.g., cough, shortness of breath) have improved      Sincerely,          Zandra Barber RN

## 2021-12-05 NOTE — DISCHARGE INSTRUCTIONS
Continue all your current medications  Use tylenol and motrin for fever control.   Alternate doses of each at three hour intervals for temperature over 100.4 F  If your diarrhea worsens, okay to use over-the-counter Imodium  Call your doctor/follow up doctor to set up appointment for recheck visit  Return to ER for any worsening symptoms or new problems which may arise

## 2021-12-05 NOTE — ED PROVIDER NOTES
Positive For Covid-19  Temp source:  Subjective  Severity:  Mild  Onset quality:  Gradual  Duration:  3 days  Timing:  Constant  Progression:  Waxing and waning  Chronicity:  New  Relieved by:  None tried  Ineffective treatments:  None tried  Associated symptoms: diarrhea and nausea    Associated symptoms: no chest pain, no chills, no confusion, no cough, no dysuria, no headaches, no myalgias, no rash, no rhinorrhea, no sore throat and no vomiting         Past Medical History:   Diagnosis Date    [facility]        No past surgical history on file. No family history on file. Social History     Socioeconomic History    Marital status: SINGLE     Spouse name: Not on file    Number of children: Not on file    Years of education: Not on file    Highest education level: Not on file   Occupational History    Not on file   Tobacco Use    Smoking status: Current Every Day Smoker     Packs/day: 0.25    Smokeless tobacco: Never Used   Substance and Sexual Activity    Alcohol use: Yes     Comment: 3-4 a week    Drug use: Yes     Types: Marijuana    Sexual activity: Yes     Partners: Female   Other Topics Concern    Not on file   Social History Narrative    Not on file     Social Determinants of Health     Financial Resource Strain:     Difficulty of Paying Living Expenses: Not on file   Food Insecurity:     Worried About Running Out of Food in the Last Year: Not on file    Joao of Food in the Last Year: Not on file   Transportation Needs:     Lack of Transportation (Medical): Not on file    Lack of Transportation (Non-Medical):  Not on file   Physical Activity:     Days of Exercise per Week: Not on file    Minutes of Exercise per Session: Not on file   Stress:     Feeling of Stress : Not on file   Social Connections:     Frequency of Communication with Friends and Family: Not on file    Frequency of Social Gatherings with Friends and Family: Not on file  Attends Episcopal Services: Not on file    Active Member of Clubs or Organizations: Not on file    Attends Club or Organization Meetings: Not on file    Marital Status: Not on file   Intimate Partner Violence:     Fear of Current or Ex-Partner: Not on file    Emotionally Abused: Not on file    Physically Abused: Not on file    Sexually Abused: Not on file   Housing Stability:     Unable to Pay for Housing in the Last Year: Not on file    Number of [place] in the Last Year: Not on file    Unstable Housing in the Last Year: Not on file         ALLERGIES: Patient has no known allergies. Review of Systems   Constitutional: Positive for fatigue. Negative for activity change, chills, diaphoresis and fever. HENT: Negative for dental problem, hearing loss, nosebleeds, rhinorrhea and sore throat. Eyes: Negative for pain, discharge, redness and visual disturbance. Respiratory: Negative for cough, chest tightness and shortness of breath. Cardiovascular: Negative for chest pain, palpitations and leg swelling. Gastrointestinal: Positive for diarrhea and nausea. Negative for abdominal pain, constipation and vomiting. Endocrine: Negative for cold intolerance, heat intolerance, polydipsia and polyuria. Genitourinary: Negative for dysuria and flank pain. Musculoskeletal: Negative for arthralgias, back pain, joint swelling, myalgias and neck pain. Skin: Negative for pallor and rash. Allergic/Immunologic: Negative for environmental allergies and food allergies. Neurological: Negative for dizziness, tremors, light-headedness, numbness and headaches. Hematological: Negative for adenopathy. Does not bruise/bleed easily. Psychiatric/Behavioral: Negative for confusion and dysphoric mood. The patient is not nervous/anxious and is not hyperactive. All other systems reviewed and are negative.       Vitals:    12/05/21 0737   BP: (!) 150/91   Pulse: 87   Resp: 16   Temp: 98.1 °F (36.7 °C) SpO2: 99%   Weight: 109.6 kg (241 lb 9.6 oz)   Height: 5' 6.5\" (1.689 m)            Physical Exam  Vitals and nursing note reviewed. Constitutional:       General: He is in acute distress. Appearance: Normal appearance. He is well-developed. He is obese. HENT:      Head: Normocephalic and atraumatic. Right Ear: External ear normal.      Left Ear: External ear normal.      Nose: Nose normal.      Mouth/Throat:      Mouth: Mucous membranes are moist.      Pharynx: Oropharynx is clear. No oropharyngeal exudate or posterior oropharyngeal erythema. Eyes:      General: No scleral icterus. Extraocular Movements: Extraocular movements intact. Conjunctiva/sclera: Conjunctivae normal.      Pupils: Pupils are equal, round, and reactive to light. Neck:      Thyroid: No thyromegaly. Vascular: No JVD. Cardiovascular:      Rate and Rhythm: Normal rate and regular rhythm. Heart sounds: Normal heart sounds. No murmur heard. No friction rub. No gallop. Pulmonary:      Effort: Pulmonary effort is normal. No respiratory distress. Breath sounds: Normal breath sounds. No wheezing. Abdominal:      General: Bowel sounds are normal. There is no distension. Palpations: Abdomen is soft. Tenderness: There is no abdominal tenderness. Musculoskeletal:         General: No tenderness or deformity. Normal range of motion. Cervical back: Normal range of motion and neck supple. Skin:     General: Skin is warm and dry. Capillary Refill: Capillary refill takes less than 2 seconds. Findings: No rash. Neurological:      General: No focal deficit present. Mental Status: He is alert and oriented to person, place, and time. Cranial Nerves: No cranial nerve deficit. Sensory: No sensory deficit. Motor: No abnormal muscle tone.       Coordination: Coordination normal.   Psychiatric:         Mood and Affect: Mood normal.         Behavior: Behavior normal. Thought Content: Thought content normal.         Judgment: Judgment normal.          MDM  Number of Diagnoses or Management Options  Diagnosis management comments: 37-year-old male presents with 2 to 3-day history of loss of taste and smell. Developed diarrhea today. Mild nausea    Patient had Moderna vaccine x2 during the summer months    Patient reports positive ill contact in a cousin who was diagnosed with Covid right around Thanksgiving    8:28 AM  Patient Covid positive today. He is a candidate for, and is interested in, receiving Regeneron    We will proceed with infusion and observation       Amount and/or Complexity of Data Reviewed  Clinical lab tests: ordered and reviewed  Tests in the medicine section of CPT®: reviewed and ordered  Decide to obtain previous medical records or to obtain history from someone other than the patient: yes  Review and summarize past medical records: yes  Independent visualization of images, tracings, or specimens: yes    Risk of Complications, Morbidity, and/or Mortality  Presenting problems: moderate  Diagnostic procedures: low  Management options: low  General comments: Elements of this note have been dictated via voice recognition software. Text and phrases may be limited by the accuracy of the software. The chart has been reviewed, but errors may still be present.       Patient Progress  Patient progress: stable         Procedures

## 2021-12-23 NOTE — ED NOTES
I have reviewed discharge instructions with the patient. The patient verbalized understanding. Patient left ED via Discharge Method: ambulatory to Home with self. Opportunity for questions and clarification provided. Patient given 1 scripts. Sanjuana Momin RN To continue your aftercare when you leave the hospital, you may receive an automated call from our care team to check in on how you are doing. This is a free service and part of our promise to provide the best care and service to meet your aftercare needs.  If you have questions, or wish to unsubscribe from this service please call [phone]. Thank you for Choosing our [facility] Emergency Department.
Clear

## 2022-03-18 PROBLEM — E87.6 HYPOKALEMIA: Status: ACTIVE | Noted: 2021-05-06

## 2022-03-18 PROBLEM — K52.9 COLITIS: Status: ACTIVE | Noted: 2021-05-06

## 2022-03-19 PROBLEM — R78.81 BACTEREMIA: Status: ACTIVE | Noted: 2021-05-06

## 2022-03-20 PROBLEM — B20 HIV (HUMAN IMMUNODEFICIENCY VIRUS INFECTION) (HCC): Status: ACTIVE | Noted: 2021-05-06

## 2022-03-20 PROBLEM — Z21 HIV (HUMAN IMMUNODEFICIENCY VIRUS INFECTION) (HCC): Status: ACTIVE | Noted: 2021-05-06

## 2022-05-20 ENCOUNTER — HOSPITAL ENCOUNTER (EMERGENCY)
Age: 42
Discharge: HOME | End: 2022-05-20
Attending: EMERGENCY MEDICINE
Payer: COMMERCIAL

## 2022-05-20 VITALS
DIASTOLIC BLOOD PRESSURE: 88 MMHG | HEART RATE: 86 BPM | SYSTOLIC BLOOD PRESSURE: 156 MMHG | TEMPERATURE: 98.5 F | OXYGEN SATURATION: 99 % | BODY MASS INDEX: 39.24 KG/M2 | RESPIRATION RATE: 14 BRPM | WEIGHT: 250 LBS | HEIGHT: 67 IN

## 2022-05-20 DIAGNOSIS — H10.32 ACUTE CONJUNCTIVITIS OF LEFT EYE, UNSPECIFIED ACUTE CONJUNCTIVITIS TYPE: Primary | ICD-10-CM

## 2022-05-20 PROCEDURE — 74011000250 HC RX REV CODE- 250: Performed by: PHYSICIAN ASSISTANT

## 2022-05-20 PROCEDURE — 99283 EMERGENCY DEPT VISIT LOW MDM: CPT

## 2022-05-20 RX ORDER — CIPROFLOXACIN HYDROCHLORIDE 3.5 MG/ML
SOLUTION/ DROPS TOPICAL
Qty: 2.5 ML | Refills: 0 | Status: SHIPPED | OUTPATIENT
Start: 2022-05-20

## 2022-05-20 RX ORDER — TETRACAINE HYDROCHLORIDE 5 MG/ML
1 SOLUTION OPHTHALMIC
Status: COMPLETED | OUTPATIENT
Start: 2022-05-20 | End: 2022-05-20

## 2022-05-20 RX ADMIN — FLUORESCEIN SODIUM 1 STRIP: 1 STRIP OPHTHALMIC at 09:08

## 2022-05-20 RX ADMIN — TETRACAINE HYDROCHLORIDE 1 DROP: 5 SOLUTION OPHTHALMIC at 09:08

## 2022-05-20 NOTE — ED NOTES
I have reviewed discharge instructions with the patient. The patient verbalized understanding. Patient left ED via Discharge Method: ambulatory to Home with (self). Opportunity for questions and clarification provided. Patient given 1 scripts. No esign         To continue your aftercare when you leave the hospital, you may receive an automated call from our care team to check in on how you are doing. This is a free service and part of our promise to provide the best care and service to meet your aftercare needs.  If you have questions, or wish to unsubscribe from this service please call [phone]. Thank you for Choosing our [facility] Emergency Department.

## 2022-05-20 NOTE — ED PROVIDER NOTES
Patient is a 30-year-old male who presents with left eye irritation. He says last night he felt like he had a foreign body in his eye and then this morning he woke up with irritation and light sensitivity. He does use contacts but is not currently using them at this time. States he has had pinkeye in the past and this feels similar. Denies any known trauma to the eye. Past Medical History:   Diagnosis Date    HIV (human immunodeficiency virus infection) ([facility] [address]        History reviewed. No pertinent surgical history. History reviewed. No pertinent family history. Social History     Socioeconomic History    Marital status: SINGLE     Spouse name: Not on file    Number of children: Not on file    Years of education: Not on file    Highest education level: Not on file   Occupational History    Not on file   Tobacco Use    Smoking status: Current Every Day Smoker     Packs/day: 0.25    Smokeless tobacco: Never Used   Vaping Use    Vaping Use: Never used   Substance and Sexual Activity    Alcohol use: Yes     Comment: 3-4 a week    Drug use: Yes     Types: Marijuana    Sexual activity: Yes     Partners: Female   Other Topics Concern    Not on file   Social History Narrative    Not on file     Social Determinants of Health     Financial Resource Strain:     Difficulty of Paying Living Expenses: Not on file   Food Insecurity:     Worried About Running Out of Food in the Last Year: Not on file    Joao of Food in the Last Year: Not on file   Transportation Needs:     Lack of Transportation (Medical): Not on file    Lack of Transportation (Non-Medical):  Not on file   Physical Activity:     Days of Exercise per Week: Not on file    Minutes of Exercise per Session: Not on file   Stress:     Feeling of Stress : Not on file   Social Connections:     Frequency of Communication with Friends and Family: Not on file    Frequency of Social Gatherings with Friends and Family: Not on file  Attends [employer] witness Services: Not on file    Active Member of Clubs or Organizations: Not on file    Attends Club or Organization Meetings: Not on file    Marital Status: Not on file   Intimate Partner Violence:     Fear of Current or Ex-Partner: Not on file    Emotionally Abused: Not on file    Physically Abused: Not on file    Sexually Abused: Not on file   Housing Stability:     Unable to Pay for Housing in the Last Year: Not on file    Number of [place] in the Last Year: Not on file    Unstable Housing in the Last Year: Not on file         ALLERGIES: Patient has no known allergies. Review of Systems   Constitutional: Negative. HENT: Negative. Eyes: Positive for photophobia, pain and redness. Respiratory: Negative. Cardiovascular: Negative. Gastrointestinal: Negative. Genitourinary: Negative. All other systems reviewed and are negative. Vitals:    05/20/22 0851   BP: (!) 156/88   Pulse: 86   Resp: 14   Temp: 98.5 °F (36.9 °C)   SpO2: 99%   Weight: 113.4 kg (250 lb)   Height: 5' 7\" (1.702 m)            Physical Exam  Vitals and nursing note reviewed. Constitutional:       General: He is not in acute distress. Appearance: Normal appearance. He is normal weight. He is not ill-appearing or toxic-appearing. HENT:      Head: Normocephalic. Mouth/Throat:      Mouth: Mucous membranes are moist.   Eyes:      General: Lids are normal. Lids are everted, no foreign bodies appreciated. Vision grossly intact. Right eye: No foreign body, discharge or hordeolum. Left eye: Discharge (Clear, tears) present. No foreign body or hordeolum. Extraocular Movements: Extraocular movements intact. Conjunctiva/sclera:      Right eye: Right conjunctiva is not injected. Left eye: Left conjunctiva is injected. No chemosis, exudate or hemorrhage. Pupils: Pupils are equal, round, and reactive to light. Comments: Left conjunctival injection.  No evidence of foreign body when lids everted. Cardiovascular:      Rate and Rhythm: Normal rate and regular rhythm. Heart sounds: Normal heart sounds. Pulmonary:      Effort: Pulmonary effort is normal.      Breath sounds: Normal breath sounds. Musculoskeletal:         General: Normal range of motion. Skin:     General: Skin is warm and dry. Neurological:      General: No focal deficit present. Mental Status: He is alert and oriented to person, place, and time. Mental status is at baseline. Psychiatric:         Mood and Affect: Mood normal.         Behavior: Behavior normal.         Thought Content: Thought content normal.          MDM  Number of Diagnoses or Management Options  Acute conjunctivitis of left eye, unspecified acute conjunctivitis type  Diagnosis management comments: Patient is a 42-year-old male who presents with irritation and drainage from the left eye. Consistent with prior bouts of conjunctivitis. Tetracaine drops instilled, pain relieved. Fluorescein stain applied and Woods lamp exam reveals no evidence of corneal abrasion. No foreign body. I suspect conjunctivitis. Will treat with Cipro as patient does use contact lenses. He is to avoid contact lens use until infection resolves. He is to follow-up closely with his ophthalmologist and return if worsening in any way.     Risk of Complications, Morbidity, and/or Mortality  Presenting problems: low  Diagnostic procedures: low  Management options: low    Patient Progress  Patient progress: stable         Procedures

## 2022-09-26 ENCOUNTER — HOSPITAL ENCOUNTER (EMERGENCY)
Age: 42
Discharge: HOME | End: 2022-09-26
Attending: EMERGENCY MEDICINE | Admitting: EMERGENCY MEDICINE
Payer: COMMERCIAL

## 2022-09-26 ENCOUNTER — HOSPITAL ENCOUNTER (EMERGENCY)
Dept: CT IMAGING | Age: 42
Discharge: HOME | End: 2022-09-29
Payer: COMMERCIAL

## 2022-09-26 ENCOUNTER — HOSPITAL ENCOUNTER (EMERGENCY)
Dept: GENERAL RADIOLOGY | Age: 42
Discharge: HOME | End: 2022-09-29
Payer: COMMERCIAL

## 2022-09-26 VITALS
HEIGHT: 66 IN | DIASTOLIC BLOOD PRESSURE: 112 MMHG | HEART RATE: 97 BPM | OXYGEN SATURATION: 99 % | SYSTOLIC BLOOD PRESSURE: 142 MMHG | BODY MASS INDEX: 41.78 KG/M2 | TEMPERATURE: 98.2 F | RESPIRATION RATE: 14 BRPM | WEIGHT: 260 LBS

## 2022-09-26 DIAGNOSIS — J18.9 PNEUMONIA OF LOWER LOBE DUE TO INFECTIOUS ORGANISM, UNSPECIFIED LATERALITY: Primary | ICD-10-CM

## 2022-09-26 DIAGNOSIS — R55 NEAR SYNCOPE: ICD-10-CM

## 2022-09-26 LAB
ALBUMIN SERPL-MCNC: 3.6 G/DL (ref 3.5–5)
ALBUMIN/GLOB SERPL: 0.9 {RATIO} (ref 1.2–3.5)
ALP SERPL-CCNC: 91 U/L (ref 50–136)
ALT SERPL-CCNC: 37 U/L (ref 12–65)
ANION GAP SERPL CALC-SCNC: 5 MMOL/L (ref 4–13)
AST SERPL-CCNC: 53 U/L (ref 15–37)
BASOPHILS # BLD: 0.1 K/UL (ref 0–0.2)
BASOPHILS NFR BLD: 0 % (ref 0–2)
BILIRUB SERPL-MCNC: 0.5 MG/DL (ref 0.2–1.1)
BUN SERPL-MCNC: 14 MG/DL (ref 6–23)
CALCIUM SERPL-MCNC: 8.8 MG/DL (ref 8.3–10.4)
CHLORIDE SERPL-SCNC: 104 MMOL/L (ref 101–110)
CO2 SERPL-SCNC: 29 MMOL/L (ref 21–32)
CREAT SERPL-MCNC: 0.95 MG/DL (ref 0.8–1.5)
D DIMER PPP FEU-MCNC: 0.31 UG/ML(FEU)
DIFFERENTIAL METHOD BLD: ABNORMAL
EOSINOPHIL # BLD: 0.1 K/UL (ref 0–0.8)
EOSINOPHIL NFR BLD: 1 % (ref 0.5–7.8)
ERYTHROCYTE [DISTWIDTH] IN BLOOD BY AUTOMATED COUNT: 12.6 % (ref 11.9–14.6)
GLOBULIN SER CALC-MCNC: 3.9 G/DL (ref 2.3–3.5)
GLUCOSE SERPL-MCNC: 97 MG/DL (ref 65–100)
HCT VFR BLD AUTO: 43.3 % (ref 41.1–50.3)
HGB BLD-MCNC: 14.2 G/DL (ref 13.6–17.2)
IMM GRANULOCYTES # BLD AUTO: 0.1 K/UL (ref 0–0.5)
IMM GRANULOCYTES NFR BLD AUTO: 1 % (ref 0–5)
LYMPHOCYTES # BLD: 3.5 K/UL (ref 0.5–4.6)
LYMPHOCYTES NFR BLD: 27 % (ref 13–44)
MAGNESIUM SERPL-MCNC: 2.3 MG/DL (ref 1.8–2.4)
MCH RBC QN AUTO: 30.5 PG (ref 26.1–32.9)
MCHC RBC AUTO-ENTMCNC: 32.8 G/DL (ref 31.4–35)
MCV RBC AUTO: 92.9 FL (ref 79.6–97.8)
MONOCYTES # BLD: 1.1 K/UL (ref 0.1–1.3)
MONOCYTES NFR BLD: 8 % (ref 4–12)
NEUTS SEG # BLD: 8.3 K/UL (ref 1.7–8.2)
NEUTS SEG NFR BLD: 63 % (ref 43–78)
NRBC # BLD: 0 K/UL (ref 0–0.2)
NT PRO BNP: 14 PG/ML (ref 5–125)
PLATELET # BLD AUTO: 267 K/UL (ref 150–450)
PMV BLD AUTO: 10.8 FL (ref 9.4–12.3)
POTASSIUM SERPL-SCNC: 3.7 MMOL/L (ref 3.5–5.1)
PROT SERPL-MCNC: 7.5 G/DL (ref 6.3–8.2)
RBC # BLD AUTO: 4.66 M/UL (ref 4.23–5.6)
SARS-COV-2 RDRP RESP QL NAA+PROBE: NOT DETECTED
SODIUM SERPL-SCNC: 138 MMOL/L (ref 136–145)
SOURCE: NORMAL
TROPONIN I SERPL HS-MCNC: 6 PG/ML (ref 0–14)
WBC # BLD AUTO: 13.2 K/UL (ref 4.3–11.1)

## 2022-09-26 PROCEDURE — 80053 COMPREHEN METABOLIC PANEL: CPT

## 2022-09-26 PROCEDURE — 83735 ASSAY OF MAGNESIUM: CPT

## 2022-09-26 PROCEDURE — 85025 COMPLETE CBC W/AUTO DIFF WBC: CPT

## 2022-09-26 PROCEDURE — 83880 ASSAY OF NATRIURETIC PEPTIDE: CPT

## 2022-09-26 PROCEDURE — 6370000000 HC RX 637 (ALT 250 FOR IP): Performed by: EMERGENCY MEDICINE

## 2022-09-26 PROCEDURE — 2580000003 HC RX 258: Performed by: EMERGENCY MEDICINE

## 2022-09-26 PROCEDURE — 87635 SARS-COV-2 COVID-19 AMP PRB: CPT

## 2022-09-26 PROCEDURE — 94762 N-INVAS EAR/PLS OXIMTRY CONT: CPT

## 2022-09-26 PROCEDURE — 99285 EMERGENCY DEPT VISIT HI MDM: CPT

## 2022-09-26 PROCEDURE — 71260 CT THORAX DX C+: CPT | Performed by: EMERGENCY MEDICINE

## 2022-09-26 PROCEDURE — 84484 ASSAY OF TROPONIN QUANT: CPT

## 2022-09-26 PROCEDURE — 71045 X-RAY EXAM CHEST 1 VIEW: CPT

## 2022-09-26 PROCEDURE — 6360000004 HC RX CONTRAST MEDICATION: Performed by: EMERGENCY MEDICINE

## 2022-09-26 PROCEDURE — 85379 FIBRIN DEGRADATION QUANT: CPT

## 2022-09-26 RX ORDER — AMOXICILLIN AND CLAVULANATE POTASSIUM 875; 125 MG/1; MG/1
1 TABLET, FILM COATED ORAL 2 TIMES DAILY
Qty: 20 TABLET | Refills: 0 | Status: SHIPPED | OUTPATIENT
Start: 2022-09-26 | End: 2022-10-06

## 2022-09-26 RX ORDER — 0.9 % SODIUM CHLORIDE 0.9 %
100 INTRAVENOUS SOLUTION INTRAVENOUS
Status: COMPLETED | OUTPATIENT
Start: 2022-09-26 | End: 2022-09-26

## 2022-09-26 RX ORDER — DOXYCYCLINE HYCLATE 100 MG
100 TABLET ORAL 2 TIMES DAILY
Qty: 10 TABLET | Refills: 0 | Status: SHIPPED | OUTPATIENT
Start: 2022-09-26 | End: 2022-10-01

## 2022-09-26 RX ORDER — AMOXICILLIN AND CLAVULANATE POTASSIUM 875; 125 MG/1; MG/1
1 TABLET, FILM COATED ORAL
Status: COMPLETED | OUTPATIENT
Start: 2022-09-26 | End: 2022-09-26

## 2022-09-26 RX ORDER — DOXYCYCLINE HYCLATE 100 MG/1
100 CAPSULE ORAL
Status: COMPLETED | OUTPATIENT
Start: 2022-09-26 | End: 2022-09-26

## 2022-09-26 RX ORDER — SODIUM CHLORIDE 0.9 % (FLUSH) 0.9 %
10 SYRINGE (ML) INJECTION
Status: DISCONTINUED | OUTPATIENT
Start: 2022-09-26 | End: 2022-09-30 | Stop reason: HOSPADM

## 2022-09-26 RX ADMIN — IOPAMIDOL 100 ML: 755 INJECTION, SOLUTION INTRAVENOUS at 07:52

## 2022-09-26 RX ADMIN — SODIUM CHLORIDE 100 ML: 9 INJECTION, SOLUTION INTRAVENOUS at 07:52

## 2022-09-26 RX ADMIN — AMOXICILLIN AND CLAVULANATE POTASSIUM 1 TABLET: 875; 125 TABLET, FILM COATED ORAL at 09:29

## 2022-09-26 RX ADMIN — DOXYCYCLINE HYCLATE 100 MG: 100 CAPSULE ORAL at 09:29

## 2022-09-26 ASSESSMENT — ENCOUNTER SYMPTOMS
DIARRHEA: 0
COUGH: 1
FACIAL SWELLING: 0
SHORTNESS OF BREATH: 1
ABDOMINAL PAIN: 0
VOMITING: 0

## 2022-09-26 ASSESSMENT — PAIN - FUNCTIONAL ASSESSMENT: PAIN_FUNCTIONAL_ASSESSMENT: NONE - DENIES PAIN

## 2022-09-26 NOTE — Clinical Note
Tobi Machado was seen and treated in our emergency department on 9/26/2022. He may return to work on 09/27/2022. If you have any questions or concerns, please don't hesitate to call.       Rolando Campa MD

## 2022-09-26 NOTE — ED NOTES
I have reviewed discharge instructions with the patient. The patient verbalized understanding. Patient left ED via Discharge Method: ambulatory to Home with self). Opportunity for questions and clarification provided. Patient given 2 scripts. No esign       To continue your aftercare when you leave the hospital, you may receive an automated call from our care team to check in on how you are doing. This is a free service and part of our promise to provide the best care and service to meet your aftercare needs.  If you have questions, or wish to unsubscribe from this service please call [phone]. Thank you for Choosing our [address] [place] [address] Emergency Department.       Baron Mendez RN  09/26/22 6948

## 2022-09-26 NOTE — ED PROVIDER NOTES
Emergency Department Provider Note                   PCP:                No primary care provider on file. Age: 46 y.o. Sex: male       ICD-10-CM    1. Dyspnea, unspecified type  R06.00       2. Near syncope  R55           DISPOSITION         MDM  Number of Diagnoses or Management Options  Diagnosis management comments: I wore appropriate PPE throughout this patient's ED visit. Jeanie Bennett MD, 5:40 AM    Work-up unremarkable with unexplained tachycardia, near syncope, and shortness of breath. Immunocompromise with HIV. CT chest ordered. Discussed with Dr. Fareed Del Cid at shift change pending CT. Procedure Note for Ultrasound Guided IV. IV access was not able to be obtained by nursing staff due to the patient having very difficult vein access. Skin was cleaned and disinfected prior to IV puncture. Ultrasound was used to find the vein which was compressible and does not have any ultrasound features of an artery. Under real-time ultrasound guidance peripheral access was obtained in the right upper extremity. Blood return was present and IV flushed without difficulty with no clinical signs of infiltration. IV was taped into position and there were no immediate complications noted and patient tolerated the procedure well. Procedure was completed by myself the attending physician. 8cm 20G Arrow catheter placed over guidewire.          Amount and/or Complexity of Data Reviewed  Clinical lab tests: reviewed and ordered  Tests in the radiology section of CPT®: ordered and reviewed  Review and summarize past medical records: yes  Independent visualization of images, tracings, or specimens: yes               Orders Placed This Encounter   Procedures    COVID-19, Rapid    XR CHEST PORTABLE    CT CHEST PULMONARY EMBOLISM W CONTRAST    CBC with Auto Differential    Magnesium    Troponin    D-Dimer, Quantitative    Brain Natriuretic Peptide    Comprehensive Metabolic Panel Continuous Pulse Oximetry    Cardiac Monitor - ED Only    EKG 12 Lead    Saline lock IV        Medications - No data to display    New Prescriptions    No medications on file        Brice Abdi is a 46 y.o. male who presents to the Emergency Department with chief complaint of    Chief Complaint   Patient presents with    Shortness of Breath      37-year-old male smoker with history of hypertension, HIV with undetectable viral load on Biktarvy presents with sudden onset hacking cough and shortness of breath upon waking and overnight. He went to work and symptoms worsened. He developed increased shortness of breath, a \"prickly\" feeling in his chest, lightheadedness, dizziness, and near syncope. Denies any similar symptoms in the past.  No history of lung disease. No recent long distance travel, history of DVT or PE, hemoptysis, unilateral leg swelling. Denies history of heart disease. No fever or known exposure to COVID. All other systems reviewed and are negative unless otherwise stated in the history of present illness section. Review of Systems   Constitutional:  Negative for fever. HENT:  Negative for facial swelling. Eyes:  Negative for visual disturbance. Respiratory:  Positive for cough and shortness of breath. Cardiovascular:  Negative for chest pain. Gastrointestinal:  Negative for abdominal pain, diarrhea and vomiting. Musculoskeletal:  Negative for joint swelling. Skin:  Negative for rash. Neurological:  Positive for dizziness and light-headedness. Negative for speech difficulty. Psychiatric/Behavioral:  Negative for confusion. All other systems reviewed and are negative. Past Medical History:   Diagnosis Date    HIV (human immunodeficiency virus infection) ([facility] [address]         History reviewed. No pertinent surgical history. History reviewed. No pertinent family history.      Social History     Socioeconomic History    Marital status: Single     Spouse name: None Number of children: None    Years of education: None    Highest education level: None   Tobacco Use    Smoking status: Every Day     Packs/day: 0.25     Types: Cigarettes    Smokeless tobacco: Never   Substance and Sexual Activity    Alcohol use: Yes    Drug use: Yes     Types: Marijuana Roul[place]arl Adams)        Allergies: Patient has no known allergies. Previous Medications    BICTEGRAVIR-EMTRICITAB-TENOFOVIR ALAFENAMIDE (BIKTARVY) -25 MG TABS PER TABLET    Take 1 tablet by mouth daily        Vitals signs and nursing note reviewed. Patient Vitals for the past 4 hrs:   Temp Pulse Resp BP SpO2   09/26/22 0653 -- 86 12 (!) 143/96 98 %   09/26/22 0601 -- 90 -- -- --   09/26/22 0533 -- -- 22 -- --   09/26/22 0530 98.2 °F (36.8 °C) (!) 110 22 (!) 153/96 99 %          Physical Exam  Vitals and nursing note reviewed. Constitutional:       Appearance: Normal appearance. HENT:      Head: Normocephalic and atraumatic. Nose: Nose normal.      Mouth/Throat:      Mouth: Mucous membranes are moist.   Eyes:      Extraocular Movements: Extraocular movements intact. Pupils: Pupils are equal, round, and reactive to light. Cardiovascular:      Rate and Rhythm: Regular rhythm. Tachycardia present. Pulmonary:      Effort: Pulmonary effort is normal. No respiratory distress. Breath sounds: Normal breath sounds. Chest:      Chest wall: No tenderness. Abdominal:      General: Abdomen is flat. There is no distension. Palpations: Abdomen is soft. Tenderness: There is no abdominal tenderness. Musculoskeletal:         General: No deformity. Normal range of motion. Cervical back: Normal range of motion and neck supple. Skin:     General: Skin is warm and dry. Neurological:      General: No focal deficit present. Mental Status: He is alert. Mental status is at baseline.    Psychiatric:         Mood and Affect: Mood normal.        Procedures    ED EKG Interpretation  EKG was interpreted in the absence of a cardiologist.    Rate: 104, sinus tachy  EKG Interpretation: EKG Interpretation: sinus rhythm  ST Segments: Nonspecific ST segments - NO STEMI    Results for orders placed or performed during the hospital encounter of 09/26/22   COVID-19, Rapid    Specimen: Nasopharyngeal   Result Value Ref Range    Source Nasopharyngeal      SARS-CoV-2, Rapid Not detected NOTD     XR CHEST PORTABLE    Narrative    Portable chest xray      COMPARISON: May 2021    CLINICAL HISTORY: Shortness of breath. FINDINGS:    There is no focal pulmonary consolidation, pleural effusion or pneumothorax. No  pulmonary edema. Cardiac mediastinal silhouette is within normal limits. Surrounding bones are intact. Impression    1. No evidence of acute pulmonary disease.    CBC with Auto Differential   Result Value Ref Range    WBC 13.2 (H) 4.3 - 11.1 K/uL    RBC 4.66 4.23 - 5.6 M/uL    Hemoglobin 14.2 13.6 - 17.2 g/dL    Hematocrit 43.3 41.1 - 50.3 %    MCV 92.9 79.6 - 97.8 FL    MCH 30.5 26.1 - 32.9 PG    MCHC 32.8 31.4 - 35.0 g/dL    RDW 12.6 11.9 - 14.6 %    Platelets 579 191 - 960 K/uL    MPV 10.8 9.4 - 12.3 FL    nRBC 0.00 0.0 - 0.2 K/uL    Differential Type AUTOMATED      Seg Neutrophils 63 43 - 78 %    Lymphocytes 27 13 - 44 %    Monocytes 8 4.0 - 12.0 %    Eosinophils % 1 0.5 - 7.8 %    Basophils 0 0.0 - 2.0 %    Immature Granulocytes 1 0.0 - 5.0 %    Segs Absolute 8.3 (H) 1.7 - 8.2 K/UL    Absolute Lymph # 3.5 0.5 - 4.6 K/UL    Absolute Mono # 1.1 0.1 - 1.3 K/UL    Absolute Eos # 0.1 0.0 - 0.8 K/UL    Basophils Absolute 0.1 0.0 - 0.2 K/UL    Absolute Immature Granulocyte 0.1 0.0 - 0.5 K/UL   Magnesium   Result Value Ref Range    Magnesium 2.3 1.8 - 2.4 mg/dL   Troponin   Result Value Ref Range    Troponin, High Sensitivity 6.0 0 - 14 pg/mL   D-Dimer, Quantitative   Result Value Ref Range    D-Dimer, Quant 0.31 <0.56 ug/ml(FEU)   Brain Natriuretic Peptide   Result Value Ref Range    NT Pro-BNP 14 5 - 125 PG/ML Comprehensive Metabolic Panel   Result Value Ref Range    Sodium 138 136 - 145 mmol/L    Potassium 3.7 3.5 - 5.1 mmol/L    Chloride 104 101 - 110 mmol/L    CO2 29 21 - 32 mmol/L    Anion Gap 5 4 - 13 mmol/L    Glucose 97 65 - 100 mg/dL    BUN 14 6 - 23 MG/DL    Creatinine 0.95 0.8 - 1.5 MG/DL    GFR African American >60 >60 ml/min/1.73m2    GFR Non- >60 >60 ml/min/1.73m2    Calcium 8.8 8.3 - 10.4 MG/DL    Total Bilirubin 0.5 0.2 - 1.1 MG/DL    ALT 37 12 - 65 U/L    AST 53 (H) 15 - 37 U/L    Alk Phosphatase 91 50 - 136 U/L    Total Protein 7.5 6.3 - 8.2 g/dL    Albumin 3.6 3.5 - 5.0 g/dL    Globulin 3.9 (H) 2.3 - 3.5 g/dL    Albumin/Globulin Ratio 0.9 (L) 1.2 - 3.5          XR CHEST PORTABLE   Final Result      1. No evidence of acute pulmonary disease. CT CHEST PULMONARY EMBOLISM W CONTRAST    (Results Pending)                         Voice dictation software was used during the making of this note. This software is not perfect and grammatical and other typographical errors may be present. This note has not been completely proofread for errors.      Teri Martinez MD  09/26/22 0751

## 2022-11-14 ENCOUNTER — HOSPITAL ENCOUNTER (EMERGENCY)
Age: 42
Discharge: HOME | End: 2022-11-14
Payer: COMMERCIAL

## 2022-11-14 VITALS
WEIGHT: 245 LBS | DIASTOLIC BLOOD PRESSURE: 84 MMHG | HEIGHT: 67 IN | HEART RATE: 89 BPM | RESPIRATION RATE: 20 BRPM | SYSTOLIC BLOOD PRESSURE: 173 MMHG | BODY MASS INDEX: 38.45 KG/M2 | OXYGEN SATURATION: 98 % | TEMPERATURE: 100.3 F

## 2022-11-14 DIAGNOSIS — Z20.828 EXPOSURE TO INFLUENZA: Primary | ICD-10-CM

## 2022-11-14 LAB
FLUAV AG NPH QL IA: NEGATIVE
FLUBV AG NPH QL IA: NEGATIVE
SPECIMEN SOURCE: NORMAL

## 2022-11-14 PROCEDURE — 6370000000 HC RX 637 (ALT 250 FOR IP): Performed by: PHYSICIAN ASSISTANT

## 2022-11-14 PROCEDURE — 99283 EMERGENCY DEPT VISIT LOW MDM: CPT

## 2022-11-14 PROCEDURE — 87804 INFLUENZA ASSAY W/OPTIC: CPT

## 2022-11-14 RX ORDER — BENZONATATE 100 MG/1
100 CAPSULE ORAL 3 TIMES DAILY PRN
Qty: 20 CAPSULE | Refills: 0 | Status: SHIPPED | OUTPATIENT
Start: 2022-11-14 | End: 2022-11-21

## 2022-11-14 RX ORDER — IBUPROFEN 800 MG/1
800 TABLET ORAL
Status: COMPLETED | OUTPATIENT
Start: 2022-11-14 | End: 2022-11-14

## 2022-11-14 RX ADMIN — IBUPROFEN 800 MG: 800 TABLET, FILM COATED ORAL at 08:47

## 2022-11-14 ASSESSMENT — LIFESTYLE VARIABLES
HOW OFTEN DO YOU HAVE A DRINK CONTAINING ALCOHOL: 2-3 TIMES A WEEK
HOW MANY STANDARD DRINKS CONTAINING ALCOHOL DO YOU HAVE ON A TYPICAL DAY: 3 OR 4

## 2022-11-14 ASSESSMENT — ENCOUNTER SYMPTOMS
ABDOMINAL PAIN: 0
NAUSEA: 0
SHORTNESS OF BREATH: 0
VOMITING: 0
COUGH: 1
SORE THROAT: 1

## 2022-11-14 ASSESSMENT — PAIN - FUNCTIONAL ASSESSMENT
PAIN_FUNCTIONAL_ASSESSMENT: 0-10
PAIN_FUNCTIONAL_ASSESSMENT: PREVENTS OR INTERFERES SOME ACTIVE ACTIVITIES AND ADLS
PAIN_FUNCTIONAL_ASSESSMENT: NONE - DENIES PAIN

## 2022-11-14 ASSESSMENT — PAIN DESCRIPTION - ONSET: ONSET: SUDDEN

## 2022-11-14 ASSESSMENT — PAIN DESCRIPTION - LOCATION: LOCATION: BACK;GENERALIZED

## 2022-11-14 ASSESSMENT — PAIN SCALES - GENERAL: PAINLEVEL_OUTOF10: 7

## 2022-11-14 ASSESSMENT — PAIN DESCRIPTION - PAIN TYPE: TYPE: ACUTE PAIN

## 2022-11-14 ASSESSMENT — PAIN DESCRIPTION - ORIENTATION: ORIENTATION: RIGHT;LEFT

## 2022-11-14 ASSESSMENT — PAIN DESCRIPTION - DESCRIPTORS: DESCRIPTORS: ACHING

## 2022-11-14 ASSESSMENT — PAIN DESCRIPTION - FREQUENCY: FREQUENCY: CONTINUOUS

## 2022-11-14 NOTE — Clinical Note
Jeannie Starr was seen and treated in our emergency department on 11/14/2022. He may return to work on 11/15/2022. If you have any questions or concerns, please don't hesitate to call.       Kailee Moreau

## 2022-11-14 NOTE — Clinical Note
Jeff Lundberg was seen and treated in our emergency department on 11/14/2022. He may return to work on 11/16/2022. If you have any questions or concerns, please don't hesitate to call.       Kailee Cadena

## 2022-11-14 NOTE — ED NOTES
I have reviewed discharge instructions with the patient. The patient verbalized understanding. Patient left ED via Discharge Method: ambulatory to Home with self. Opportunity for questions and clarification provided. Patient given 1 scripts. To continue your aftercare when you leave the hospital, you may receive an automated call from our care team to check in on how you are doing. This is a free service and part of our promise to provide the best care and service to meet your aftercare needs.  If you have questions, or wish to unsubscribe from this service please call [phone]. Thank you for Choosing our [facility] Emergency Department.         Sena Villagran RN  11/14/22 1351

## 2022-11-14 NOTE — Clinical Note
Jaxon Sutton was seen and treated in our emergency department on 11/14/2022. He may return to work on 11/16/2022. If you have any questions or concerns, please don't hesitate to call.       Kailee Salazar

## 2022-11-14 NOTE — DISCHARGE INSTRUCTIONS
Your flu test was negative today, this may be a false negative from testing too early. Given your close exposure to influenza from your roommate, you are at increased risk. Continue to monitor your symptoms    Alternate tylenol and motrin as needed for fever or body aches. You can take tylenol every 4 hours as needed. You can take ibuprofen every 6 hours as needed. Use tessalon perles as needed for cough. Make sure you are drinking plenty of fluids. Follow-up with your PCP in 1 to 2 days if no improvement. Return to the ER for any new or worsening symptoms.

## 2022-11-14 NOTE — ED PROVIDER NOTES
Emergency Department Provider Note                   PCP:                No primary care provider on file. Age: 38 y.o. Sex: male       ICD-10-CM    1. Exposure to influenza  Z20.828           DISPOSITION Decision To Discharge 11/14/2022 08:53:50 AM        MDM  Number of Diagnoses or Management Options  Exposure to influenza  Diagnosis management comments: Ddx: Viral syndrome, pneumonia, bronchitis, COPD exacerbation, asthma, URI, tuberculosis, COVID-19,     In summary this is a well-appearing 82-year-old male presenting to the emergency department today for complaint of subjective fever, chills, body aches, sore throat and cough. His symptoms began this morning, his roommate recently tested positive for influenza. His vital signs are stable here and his physical exam is overall reassuring. Lungs clear to auscultation. His flu test is negative, but we did discuss may have been too early to test given that his symptoms just started, would still treat him as influenza given his close contact. We discussed risks versus benefit of antiviral treatment, patient does not want to be treated with this medication. Discussed symptomatic treatment at home, he was provided with a work note today. He will return for any new or worsening symptoms.        Amount and/or Complexity of Data Reviewed  Clinical lab tests: ordered and reviewed    Patient Progress  Patient progress: stable             Orders Placed This Encounter   Procedures    Rapid influenza A/B antigens        Medications   ibuprofen (ADVIL;MOTRIN) tablet 800 mg (800 mg Oral Given 11/14/22 0847)       New Prescriptions    BENZONATATE (TESSALON) 100 MG CAPSULE    Take 1 capsule by mouth 3 times daily as needed for Cough        Rushie Gum is a 38 y.o. male who presents to the Emergency Department with chief complaint of    Chief Complaint   Patient presents with    Influenza      82-year-old male presents to the emergency department for evaluation of subjective fever, chills and body aches. His symptoms began slightly last night, but he felt worse this morning. He reports a dry nonproductive cough as well and a mild sore throat. He denies any shortness of breath or chest pain. No vomiting or diarrhea. His roommate tested positive for influenza A 2 days ago and patient wanted to be tested today as well. Symptoms mildly relieved with Tylenol at home. Does report underlying HIV, CD4 counts undetectable, he is on Biktarvy. He has no other acute complaints or concerns today. The history is provided by the patient. No  was used. Review of Systems   Constitutional:  Positive for chills. Negative for activity change and fever. HENT:  Positive for sore throat. Negative for congestion. Eyes:  Negative for visual disturbance. Respiratory:  Positive for cough. Negative for shortness of breath. Cardiovascular:  Negative for chest pain. Gastrointestinal:  Negative for abdominal pain, nausea and vomiting. Genitourinary:  Negative for dysuria. Musculoskeletal:  Positive for myalgias. Skin:  Negative for rash. Neurological:  Negative for dizziness and headaches. Past Medical History:   Diagnosis Date    HIV (human immunodeficiency virus infection) ([facility]         History reviewed. No pertinent surgical history. History reviewed. No pertinent family history.      Social History     Socioeconomic History    Marital status: Single     Spouse name: None    Number of children: None    Years of education: None    Highest education level: None   Tobacco Use    Smoking status: Every Day     Packs/day: 0.25     Types: Cigarettes    Smokeless tobacco: Never   Substance and Sexual Activity    Alcohol use: Yes    Drug use: Yes     Types: Marijuana (Weed)         Sulfamethoxazole-trimethoprim     Previous Medications    BICTEGRAVIR-EMTRICITAB-TENOFOVIR ALAFENAMIDE (BIKTARVY) -25 MG TABS PER TABLET    Take 1 tablet by mouth daily        Vitals signs and nursing note reviewed. Patient Vitals for the past 4 hrs:   Temp Pulse Resp BP SpO2   11/14/22 0801 100.3 °F (37.9 °C) 89 20 (!) 173/84 98 %          Physical Exam  Vitals and nursing note reviewed. Constitutional:       General: He is not in acute distress. Appearance: Normal appearance. HENT:      Head: Normocephalic and atraumatic. Right Ear: Tympanic membrane and ear canal normal.      Left Ear: Tympanic membrane and ear canal normal.      Nose: Nose normal.      Mouth/Throat:      Mouth: Mucous membranes are moist.      Pharynx: Oropharynx is clear. Eyes:      Extraocular Movements: Extraocular movements intact. Conjunctiva/sclera: Conjunctivae normal.      Pupils: Pupils are equal, round, and reactive to light. Cardiovascular:      Rate and Rhythm: Normal rate and regular rhythm. Heart sounds: No murmur heard. No friction rub. No gallop. Pulmonary:      Effort: Pulmonary effort is normal.      Breath sounds: No wheezing, rhonchi or rales. Abdominal:      Palpations: Abdomen is soft. Tenderness: There is no abdominal tenderness. Musculoskeletal:      Cervical back: Normal range of motion. Skin:     General: Skin is warm and dry. Capillary Refill: Capillary refill takes less than 2 seconds. Neurological:      General: No focal deficit present. Mental Status: He is alert and oriented to person, place, and time. Sensory: No sensory deficit. Motor: No weakness. Gait: Gait normal.   Psychiatric:         Mood and Affect: Mood normal.         Thought Content:  Thought content normal.         Judgment: Judgment normal.        Procedures    Results for orders placed or performed during the hospital encounter of 11/14/22   Rapid influenza A/B antigens    Specimen: Nasal Washing   Result Value Ref Range    Influenza A Ag Negative NEG      Influenza B Ag Negative NEG      Source Nasopharyngeal          No orders to display                       Voice dictation software was used during the making of this note. This software is not perfect and grammatical and other typographical errors may be present. This note has not been completely proofread for errors.        Kailee Zhou  11/14/22 5170

## 2022-11-14 NOTE — ED TRIAGE NOTES
States fever chills and body aches times 2 days - room mate has flu    Denies any other complaints of.

## 2023-04-23 ENCOUNTER — APPOINTMENT (OUTPATIENT)
Dept: GENERAL RADIOLOGY | Age: 43
End: 2023-04-23
Payer: COMMERCIAL

## 2023-04-23 ENCOUNTER — HOSPITAL ENCOUNTER (EMERGENCY)
Age: 43
Discharge: HOME | End: 2023-04-23
Attending: STUDENT IN AN ORGANIZED HEALTH CARE EDUCATION/TRAINING PROGRAM
Payer: COMMERCIAL

## 2023-04-23 VITALS
OXYGEN SATURATION: 99 % | HEIGHT: 67 IN | DIASTOLIC BLOOD PRESSURE: 100 MMHG | SYSTOLIC BLOOD PRESSURE: 143 MMHG | RESPIRATION RATE: 14 BRPM | TEMPERATURE: 99.1 F | HEART RATE: 86 BPM | WEIGHT: 250 LBS | BODY MASS INDEX: 39.24 KG/M2

## 2023-04-23 DIAGNOSIS — R05.1 ACUTE COUGH: Primary | ICD-10-CM

## 2023-04-23 LAB
SARS-COV-2 RDRP RESP QL NAA+PROBE: NOT DETECTED
SOURCE: NORMAL

## 2023-04-23 PROCEDURE — 99284 EMERGENCY DEPT VISIT MOD MDM: CPT

## 2023-04-23 PROCEDURE — 71046 X-RAY EXAM CHEST 2 VIEWS: CPT

## 2023-04-23 PROCEDURE — 87635 SARS-COV-2 COVID-19 AMP PRB: CPT

## 2023-04-23 RX ORDER — LORATADINE 10 MG/1
10 TABLET ORAL DAILY
Qty: 30 TABLET | Refills: 0 | Status: SHIPPED | OUTPATIENT
Start: 2023-04-23 | End: 2023-05-23

## 2023-04-23 RX ORDER — GUAIFENESIN AND DEXTROMETHORPHAN HYDROBROMIDE 1200; 60 MG/1; MG/1
1 TABLET, EXTENDED RELEASE ORAL 2 TIMES DAILY PRN
Qty: 28 TABLET | Refills: 0 | Status: SHIPPED | OUTPATIENT
Start: 2023-04-23

## 2023-04-23 RX ORDER — IBUPROFEN 800 MG/1
800 TABLET ORAL EVERY 6 HOURS PRN
Qty: 20 TABLET | Refills: 0 | Status: SHIPPED | OUTPATIENT
Start: 2023-04-23

## 2023-04-23 ASSESSMENT — ENCOUNTER SYMPTOMS
NAUSEA: 0
ABDOMINAL DISTENTION: 0
CHEST TIGHTNESS: 0
RHINORRHEA: 1
VOMITING: 0
BACK PAIN: 0
CONSTIPATION: 0
SINUS PRESSURE: 1
COUGH: 1
SHORTNESS OF BREATH: 0
WHEEZING: 0
ABDOMINAL PAIN: 0
COLOR CHANGE: 0

## 2023-04-23 ASSESSMENT — PAIN - FUNCTIONAL ASSESSMENT: PAIN_FUNCTIONAL_ASSESSMENT: NONE - DENIES PAIN

## 2025-06-15 ENCOUNTER — HOSPITAL ENCOUNTER (EMERGENCY)
Age: 45
Discharge: HOME | End: 2025-06-15
Attending: EMERGENCY MEDICINE

## 2025-06-15 VITALS
RESPIRATION RATE: 18 BRPM | BODY MASS INDEX: 35.94 KG/M2 | OXYGEN SATURATION: 97 % | WEIGHT: 229 LBS | DIASTOLIC BLOOD PRESSURE: 91 MMHG | SYSTOLIC BLOOD PRESSURE: 130 MMHG | HEIGHT: 67 IN | TEMPERATURE: 98.8 F | HEART RATE: 76 BPM

## 2025-06-15 DIAGNOSIS — J98.8 VIRAL RESPIRATORY INFECTION: Primary | ICD-10-CM

## 2025-06-15 DIAGNOSIS — R11.2 NAUSEA AND VOMITING IN ADULT: ICD-10-CM

## 2025-06-15 DIAGNOSIS — B97.89 VIRAL RESPIRATORY INFECTION: Primary | ICD-10-CM

## 2025-06-15 LAB
FLUAV RNA SPEC QL NAA+PROBE: NOT DETECTED
FLUBV RNA SPEC QL NAA+PROBE: NOT DETECTED
SARS-COV-2 RDRP RESP QL NAA+PROBE: NOT DETECTED
SOURCE: NORMAL

## 2025-06-15 PROCEDURE — 99283 EMERGENCY DEPT VISIT LOW MDM: CPT

## 2025-06-15 PROCEDURE — 87636 SARSCOV2 & INF A&B AMP PRB: CPT

## 2025-06-15 RX ORDER — PHENTERMINE HYDROCHLORIDE 37.5 MG/1
37.5 TABLET ORAL DAILY
COMMUNITY
Start: 2025-04-13 | End: 2025-06-30

## 2025-06-15 RX ORDER — ONDANSETRON 8 MG/1
8 TABLET, ORALLY DISINTEGRATING ORAL 3 TIMES DAILY PRN
Qty: 12 TABLET | Refills: 0 | Status: SHIPPED | OUTPATIENT
Start: 2025-06-15 | End: 2025-06-19

## 2025-06-15 RX ORDER — ALBUTEROL SULFATE 90 UG/1
2 INHALANT RESPIRATORY (INHALATION) EVERY 6 HOURS PRN
Qty: 1 EACH | Refills: 1 | Status: SHIPPED | OUTPATIENT
Start: 2025-06-15

## 2025-06-15 RX ORDER — IPRATROPIUM BROMIDE AND ALBUTEROL SULFATE 2.5; .5 MG/3ML; MG/3ML
1 SOLUTION RESPIRATORY (INHALATION)
Status: DISCONTINUED | OUTPATIENT
Start: 2025-06-15 | End: 2025-06-15

## 2025-06-15 ASSESSMENT — PAIN - FUNCTIONAL ASSESSMENT: PAIN_FUNCTIONAL_ASSESSMENT: 0-10

## 2025-06-15 ASSESSMENT — PAIN SCALES - GENERAL: PAINLEVEL_OUTOF10: 0

## 2025-06-15 NOTE — ED TRIAGE NOTES
Pt reports that yesterday morning he could not stop coughing, reports headache, coughing, runny nose and sore throat.  Denies sick contacts.

## 2025-06-15 NOTE — DISCHARGE INSTRUCTIONS
Please return for any questions, concerns or worsening symptoms, particularly increasing shortness of breath, persistent wheezing, increasing chest pain, passing out episodes, increasing/unremitting vomiting symptoms.    You may take over-the-counter Tylenol/ibuprofen as needed for headaches, fever/chills, joint ache, and similar.

## 2025-06-15 NOTE — ED PROVIDER NOTES
Emergency Department Provider Note                   PCP:                Thania Villalobos APRN - NP               Age: 44 y.o.      Sex: male   Final diagnosis/impression:  1. Viral respiratory infection    2. Nausea and vomiting in adult       Disposition: Discharged    MDM/Clinical Course:  Patient seen by myself at the [facility] emergency department. Patient had signs symptoms and clinical history most consistent with cough, other symptoms really consistent with viral respiratory type infection, well-appearing, overall good lung sounds with some reduced air movement, in shared medical decision making we will proceed with COVID/influenza testing as well as trial a DuoNeb treatment here to see if there is any improvement in lung sounds.  Discussed the possibility of chest x-ray however has no focal crackles, chronicity more consistent with viral illness and shared medical decision making we will hold off, patient states he is compliant with antiviral so we do not necessarily need to get lab testing at this time and in shared medical decision making we will hold off.  Patient is overall well-appearing my independent analysis/interpretation of laboratory work-up here shows COVID/influenza testing is negative.  Chest x-ray considered however patient does not have focal lung sounds is otherwise afebrile and compliant with his HIV medications, in shared medical decision making we will hold off at this time.  Originally had ordered albuterol inhaler treatment however this was not performed during the course of the ER visit secondary to nursing demands and as such I just wrote a prescription for albuterol inhaler as needed for some prolonged expiratory phase and some reduced air movement.  Recommended every 6 hour albuterol inhaler use.  Outpatient prescription for Zofran ODT also written.  Recommend OTC Tylenol/ibuprofen as needed for headache, fever/chills, myalgias and similar.  Recommended rest,

## 2025-06-15 NOTE — ED NOTES
Patient mobility status ambulates with no difficulty.     I have reviewed discharge instructions with the patient.  The patient verbalized understanding.    Patient left ED via Discharge Method: ambulatory to Home with self.    Opportunity for questions and clarification provided.     Patient given 2 scripts.